# Patient Record
Sex: MALE | Race: WHITE | NOT HISPANIC OR LATINO | Employment: OTHER | URBAN - NONMETROPOLITAN AREA
[De-identification: names, ages, dates, MRNs, and addresses within clinical notes are randomized per-mention and may not be internally consistent; named-entity substitution may affect disease eponyms.]

---

## 2017-07-17 PROBLEM — S02.92XA MULTIPLE CLOSED FRACTURES OF FACIAL BONE: Status: ACTIVE | Noted: 2017-07-17

## 2017-07-17 PROBLEM — S02.92XA: Status: RESOLVED | Noted: 2017-07-17 | Resolved: 2017-07-17

## 2020-12-16 PROBLEM — S76.219A GROIN STRAIN: Status: RESOLVED | Noted: 2020-09-16 | Resolved: 2020-12-16

## 2021-09-16 PROBLEM — M75.02 ADHESIVE CAPSULITIS OF LEFT SHOULDER: Status: RESOLVED | Noted: 2017-12-23 | Resolved: 2021-09-16

## 2023-02-14 ENCOUNTER — TELEPHONE (OUTPATIENT)
Dept: FAMILY MEDICINE CLINIC | Facility: CLINIC | Age: 48
End: 2023-02-14

## 2023-02-14 NOTE — TELEPHONE ENCOUNTER
Caller: Laila Stephens    Relationship: Emergency Contact    Best call back number: [phone]    What medication are you requesting:     ANTIBIOTICS    What are your current symptoms:     SORE THROAT, COUGH, HOARSE    How long have you been experiencing symptoms: THREE DAYS       If a prescription is needed, what is your preferred pharmacy and phone number:      [facility] [place], [place] - [address] - [phone]  - [phone] FX

## 2023-02-15 NOTE — TELEPHONE ENCOUNTER
His wife is going to ask him and let us know. She did say that they did an at home test and it came back negative.

## 2023-07-27 ENCOUNTER — OFFICE VISIT (OUTPATIENT)
Dept: FAMILY MEDICINE CLINIC | Facility: CLINIC | Age: 48
End: 2023-07-27
Payer: MEDICARE

## 2023-07-27 VITALS
HEART RATE: 102 BPM | OXYGEN SATURATION: 96 % | WEIGHT: 315 LBS | SYSTOLIC BLOOD PRESSURE: 124 MMHG | BODY MASS INDEX: 50.62 KG/M2 | TEMPERATURE: 98.6 F | DIASTOLIC BLOOD PRESSURE: 65 MMHG | HEIGHT: 66 IN | RESPIRATION RATE: 15 BRPM

## 2023-07-27 DIAGNOSIS — I10 ESSENTIAL HYPERTENSION: ICD-10-CM

## 2023-07-27 DIAGNOSIS — Z86.711 HISTORY OF PULMONARY EMBOLISM: ICD-10-CM

## 2023-07-27 DIAGNOSIS — K21.9 GASTROESOPHAGEAL REFLUX DISEASE WITHOUT ESOPHAGITIS: ICD-10-CM

## 2023-07-27 DIAGNOSIS — E11.42 TYPE 2 DIABETES MELLITUS WITH PERIPHERAL NEUROPATHY: ICD-10-CM

## 2023-07-27 DIAGNOSIS — E11.9 TYPE 2 DIABETES MELLITUS WITHOUT COMPLICATION, WITHOUT LONG-TERM CURRENT USE OF INSULIN: ICD-10-CM

## 2023-07-27 DIAGNOSIS — Z00.00 HEALTHCARE MAINTENANCE: ICD-10-CM

## 2023-07-27 DIAGNOSIS — G47.33 OBSTRUCTIVE SLEEP APNEA: ICD-10-CM

## 2023-07-27 DIAGNOSIS — G56.03 BILATERAL CARPAL TUNNEL SYNDROME: ICD-10-CM

## 2023-07-27 DIAGNOSIS — K44.9 PARAESOPHAGEAL HIATAL HERNIA: ICD-10-CM

## 2023-07-27 DIAGNOSIS — N52.01 ERECTILE DYSFUNCTION DUE TO ARTERIAL INSUFFICIENCY: ICD-10-CM

## 2023-07-27 DIAGNOSIS — M25.561 CHRONIC PAIN OF BOTH KNEES: ICD-10-CM

## 2023-07-27 DIAGNOSIS — G89.11 ACUTE PAIN DUE TO TRAUMA: ICD-10-CM

## 2023-07-27 DIAGNOSIS — M25.562 CHRONIC PAIN OF BOTH KNEES: ICD-10-CM

## 2023-07-27 DIAGNOSIS — E55.9 VITAMIN D DEFICIENCY: ICD-10-CM

## 2023-07-27 DIAGNOSIS — D50.0 IRON DEFICIENCY ANEMIA DUE TO CHRONIC BLOOD LOSS: ICD-10-CM

## 2023-07-27 DIAGNOSIS — R35.0 URINARY FREQUENCY: ICD-10-CM

## 2023-07-27 DIAGNOSIS — D35.2 PITUITARY ADENOMA: ICD-10-CM

## 2023-07-27 DIAGNOSIS — I87.2 CHRONIC VENOUS INSUFFICIENCY: ICD-10-CM

## 2023-07-27 DIAGNOSIS — E78.2 MIXED HYPERLIPIDEMIA: Primary | ICD-10-CM

## 2023-07-27 DIAGNOSIS — G89.29 CHRONIC PAIN OF BOTH KNEES: ICD-10-CM

## 2023-07-27 DIAGNOSIS — M54.59 MECHANICAL LOW BACK PAIN: ICD-10-CM

## 2023-07-27 PROCEDURE — 83036 HEMOGLOBIN GLYCOSYLATED A1C: CPT | Performed by: GENERAL PRACTICE

## 2023-07-27 PROCEDURE — 83540 ASSAY OF IRON: CPT | Performed by: GENERAL PRACTICE

## 2023-07-27 PROCEDURE — 85025 COMPLETE CBC W/AUTO DIFF WBC: CPT | Performed by: GENERAL PRACTICE

## 2023-07-27 PROCEDURE — 84443 ASSAY THYROID STIM HORMONE: CPT | Performed by: GENERAL PRACTICE

## 2023-07-27 PROCEDURE — 80053 COMPREHEN METABOLIC PANEL: CPT | Performed by: GENERAL PRACTICE

## 2023-07-27 PROCEDURE — 82306 VITAMIN D 25 HYDROXY: CPT | Performed by: GENERAL PRACTICE

## 2023-07-27 PROCEDURE — 80061 LIPID PANEL: CPT | Performed by: GENERAL PRACTICE

## 2023-07-27 PROCEDURE — 82043 UR ALBUMIN QUANTITATIVE: CPT | Performed by: GENERAL PRACTICE

## 2023-07-27 PROCEDURE — 82728 ASSAY OF FERRITIN: CPT | Performed by: GENERAL PRACTICE

## 2023-07-27 PROCEDURE — 84466 ASSAY OF TRANSFERRIN: CPT | Performed by: GENERAL PRACTICE

## 2023-07-27 RX ORDER — ACETAMINOPHEN AND CODEINE PHOSPHATE 300; 30 MG/1; MG/1
TABLET ORAL
Qty: 120 TABLET | Refills: 2 | Status: SHIPPED | OUTPATIENT
Start: 2023-07-27

## 2023-07-27 RX ORDER — GABAPENTIN 300 MG/1
300 CAPSULE ORAL 2 TIMES DAILY
Qty: 60 CAPSULE | Refills: 2 | Status: SHIPPED | OUTPATIENT
Start: 2023-07-27

## 2023-07-27 RX ORDER — ATORVASTATIN CALCIUM 80 MG/1
80 TABLET, FILM COATED ORAL NIGHTLY
Qty: 90 TABLET | Refills: 3 | Status: SHIPPED | OUTPATIENT
Start: 2023-07-27

## 2023-07-27 RX ORDER — OXYBUTYNIN CHLORIDE 10 MG/1
10 TABLET, EXTENDED RELEASE ORAL DAILY
Qty: 90 TABLET | Refills: 3 | Status: SHIPPED | OUTPATIENT
Start: 2023-07-27

## 2023-07-27 RX ORDER — EZETIMIBE 10 MG/1
10 TABLET ORAL DAILY
Qty: 90 TABLET | Refills: 3 | Status: SHIPPED | OUTPATIENT
Start: 2023-07-27

## 2023-07-27 RX ORDER — FERROUS SULFATE 325(65) MG
325 TABLET ORAL 2 TIMES DAILY
Qty: 180 TABLET | Refills: 1 | Status: SHIPPED | OUTPATIENT
Start: 2023-07-27

## 2023-07-27 RX ORDER — PANTOPRAZOLE SODIUM 40 MG/1
40 TABLET, DELAYED RELEASE ORAL DAILY
Qty: 90 TABLET | Refills: 3 | Status: SHIPPED | OUTPATIENT
Start: 2023-07-27

## 2023-07-27 RX ORDER — ASCORBIC ACID 500 MG
TABLET ORAL
Qty: 180 TABLET | Refills: 1 | Status: SHIPPED | OUTPATIENT
Start: 2023-07-27

## 2023-07-27 RX ORDER — NAPROXEN 500 MG/1
500 TABLET ORAL 2 TIMES DAILY PRN
Qty: 60 TABLET | Refills: 5 | Status: SHIPPED | OUTPATIENT
Start: 2023-07-27

## 2023-07-27 RX ORDER — CYCLOBENZAPRINE HCL 10 MG
10 TABLET ORAL 3 TIMES DAILY PRN
Qty: 90 TABLET | Refills: 5 | Status: SHIPPED | OUTPATIENT
Start: 2023-07-27

## 2023-07-27 RX ORDER — ERGOCALCIFEROL 1.25 MG/1
50000 CAPSULE ORAL WEEKLY
Qty: 12 CAPSULE | Refills: 1 | Status: SHIPPED | OUTPATIENT
Start: 2023-07-27

## 2023-07-27 NOTE — PROGRESS NOTES
Subjective   MAXIME Stephens is a 47 y.o. male.     Chief Complaint  He returns for a scheduled reassessment of multiple medical problems including recurrent DVT/PE, recurrent iron deficiency anemia, pituitary adenoma, type 2 diabetes mellitus, hyperlipidemia, chronic back and joint pain, and bilateral carpal tunnel syndrome    History of Present Illness     Recurrent DVT/PE  S/P IVC filter. He continues to deny any change in his lower extremity swelling or shortness of breath, and has had no calf pain, chest pain or hemoptysis. He ran out of rivaroxaban several weeks ago.  He is quite aware of the importance of continued anticoagulation    Iron Deficiency Anemia  He has required transfusion of packed red blood cells in the past. He continues to deny any hematochezia, melena, or hematuria.  He remains on ferrous sulfate 325 twice daily.  Lab Results   Component Value Date    WBC 6.01 02/20/2023    HGB 13.6 02/20/2023    HCT 43.2 02/20/2023    MCV 83.1 02/20/2023     02/20/2023     Lab Results   Component Value Date    IRON 35 (L) 02/20/2023    TIBC 414 10/06/2017    FERRITIN 42.90 02/20/2023     Pituitary Adenoma  This was discovered incidentally when he was worked up for thrombosed orbital varices. It was felt to be nonsecreting and was stable on several follow-up studies but he has not undergone neurosurgical reassessment for several years.  He denies any new headaches and has had no visual disturbances.  Due to his size, a local MRI has not been possible    Type 2 Diabetes Mellitus  He admits to paresthesias of the feet.  He continues to deny any visual disturbances, polydipsia, polyuria, hypoglycemia or foot ulcerations.  He does not check his glucose.  He remains on metformin.  He did quite well with semaglutide but is unable to afford it.  His last dilated eye exam was more then one year ago.    Lab Results   Component Value Date    HGBA1C 6.60 (H) 02/20/2023     Lab Results   Component Value Date     MICROALBUR <1.2 07/26/2022      Hyperlipidemia  His compliance with treatment has been fair.  He has been trying to follow his diet and get what exercise his back and joint pain permits. He remains on atorvastatin with no apparent side effects.  Lab Results   Component Value Date    CHOL 196 02/20/2023    CHLPL 210 (H) 03/29/2016    TRIG 115 02/20/2023    HDL 38 (L) 02/20/2023     (H) 02/20/2023     Urinary Frequency  He underwent a urology assessment with Dr. Gonzalez and was started on oxybutynin every evening with an improvement. Apart from a dry mouth he has not experienced any side effects.     Joint Pain  He has chronic low back, left shoulder, and bilateral knee pain.  He feels this has worsened with time, but continues to deny any change in the quality or any new associated symptoms.  He remains on naproxen, gabapentin, cyclobenzaprine, and codeine/APAP with fairly good effect and no apparent side effects.      Carpal Tunnel Syndrome  He continues to experience intermittent paresthesias of both hands extending proximally to the mid forearms.  This remains present more than not and has been associated with some reduction in his  strength.  There is no history of any numbness and he continues to deny any new neck pain, nor any change in his lower extremity strength or bowel/bladder control.  With the exception of activity he has been unable to identify any exacerbating factors. Shaking his hands seems to help and he has been wearing splints at night with some improvement.  NCS/EMG performed on 10/9/2019 confirmed changes consistent with a bilateral median neuropathy worse on the right.  He remains uninterested in pursuing a surgical assessment at present    Labs  Most recent vitamin D 18.2.  He was started back on supplementation afterward  Lab Results   Component Value Date    GLUCOSE 94 02/20/2023    BUN 7 02/20/2023    CREATININE 0.63 (L) 02/20/2023    EGFR 116.6 02/20/2023    BCR 11.1 02/20/2023     K 4.3 02/20/2023    CO2 29.3 (H) 02/20/2023    CALCIUM 9.2 02/20/2023    ALBUMIN 3.7 02/20/2023    BILITOT 0.4 02/20/2023    AST 31 02/20/2023    ALT 32 02/20/2023     Lab Results   Component Value Date    ALKPHOS 81 02/20/2023     The following portions of the patient's history were reviewed and updated as appropriate: allergies, current medications, past medical history, past social history, and problem list.    Review of Systems   Constitutional:  Positive for fatigue. Negative for appetite change, chills and fever.   HENT:  Negative for congestion, ear pain, rhinorrhea, sneezing and sore throat.    Eyes:  Negative for visual disturbance.   Respiratory:  Positive for cough and shortness of breath. Negative for wheezing.    Cardiovascular:  Positive for leg swelling. Negative for chest pain and palpitations.   Gastrointestinal:  Negative for abdominal pain, blood in stool, constipation, diarrhea, nausea and vomiting.   Endocrine: Negative for polydipsia and polyuria.        Intermittent hot flashes   Genitourinary:  Positive for frequency. Negative for difficulty urinating, dysuria, hematuria and urgency.   Musculoskeletal:  Positive for arthralgias and back pain. Negative for joint swelling and neck pain.   Skin:  Negative for rash.   Neurological:  Positive for weakness (both hands) and numbness (both feet). Negative for dizziness, tremors and headaches.        Paresthesias both hands   Psychiatric/Behavioral:  Positive for decreased concentration. Negative for dysphoric mood and sleep disturbance. The patient is not nervous/anxious.      Objective   Physical Exam  Constitutional:       General: He is not in acute distress.     Appearance: Normal appearance. He is well-developed. He is not ill-appearing or diaphoretic.      Comments: Bright and in fair spirits.  Antalgic gait.  Able to climb onto the exam table with minimal one-person assistance.  No apparent distress at rest.  No pallor, jaundice,  diaphoresis, or cyanosis.   HENT:      Head: Atraumatic.      Right Ear: Tympanic membrane, ear canal and external ear normal.      Left Ear: Tympanic membrane, ear canal and external ear normal.   Eyes:      Conjunctiva/sclera: Conjunctivae normal.   Neck:      Thyroid: No thyroid mass or thyromegaly.      Vascular: No carotid bruit or JVD.      Trachea: Trachea normal. No tracheal deviation.   Cardiovascular:      Rate and Rhythm: Normal rate and regular rhythm.      Heart sounds: Normal heart sounds, S1 normal and S2 normal. No murmur heard.    No gallop. No S3 or S4 sounds.   Pulmonary:      Effort: Pulmonary effort is normal.      Breath sounds: Normal breath sounds.   Abdominal:      General: Abdomen is protuberant. Bowel sounds are normal.   Musculoskeletal:      Right lower le+ Pitting Edema (with stasis changes) present.      Left lower le+ Pitting Edema (with stasis changes) present.   Lymphadenopathy:      Head:      Right side of head: No submental, submandibular, tonsillar, preauricular, posterior auricular or occipital adenopathy.      Left side of head: No submental, submandibular, tonsillar, preauricular, posterior auricular or occipital adenopathy.      Cervical: No cervical adenopathy.      Upper Body:      Right upper body: No supraclavicular adenopathy.      Left upper body: No supraclavicular adenopathy.   Skin:     General: Skin is warm and dry.      Coloration: Skin is not cyanotic, jaundiced or pale.      Findings: No rash.      Nails: There is no clubbing.   Neurological:      Mental Status: He is alert and oriented to person, place, and time.      Cranial Nerves: No cranial nerve deficit.      Sensory: Sensory deficit (decreased vibration sense toes of both feet) present.      Motor: No tremor.      Coordination: Coordination normal.      Gait: Gait normal.   Psychiatric:         Attention and Perception: Attention normal.         Mood and Affect: Mood normal.         Speech: Speech  normal.         Behavior: Behavior normal.         Thought Content: Thought content normal.     Assessment & Plan   Problems Addressed this Visit          Cardiac and Vasculature    Chronic venous insufficiency  Reminded regarding lifestyle modification    Essential hypertension   Hypertension:  BP remains at goal off medication . Evidence of target organ damage: none.  Encouraged to continue to work on diet and exercise plan.   We will continue to monitor BP and urine microalbumin levels    Relevant Orders    Comprehensive Metabolic Panel    TSH    Mixed hyperlipidemia   As above.   Continue current medication.    Relevant Medications    atorvastatin (LIPITOR) 80 MG tablet    ezetimibe (ZETIA) 10 MG tablet    Other Relevant Orders    Comprehensive Metabolic Panel    Lipid Panel    TSH       Coag and Thromboembolic    History of pulmonary embolism  Reminded of the importance of continued anticoagulation  Continue current medication    Relevant Medications    rivaroxaban (Xarelto) 20 MG tablet    Other Relevant Orders    CBC & Differential       Endocrine and Metabolic    Type 2 diabetes mellitus with peripheral neuropathy  Diabetes mellitus Type II, under excellent control.   Encouraged to continue to pursue ADA diet  Encouraged aerobic exercise.  Continue current medication  Reminded to get yearly retinal exam.  Updated labs drawn.    Relevant Medications    metFORMIN (GLUCOPHAGE) 500 MG tablet    Other Relevant Orders    Comprehensive Metabolic Panel    Hemoglobin A1c    MicroAlbumin, Urine, Random - Urine, Clean Catch    TSH    Vitamin D deficiency  Continue supplementation with monitoring.    Relevant Medications    vitamin D (ERGOCALCIFEROL) 1.25 MG (82679 UT) capsule capsule    Other Relevant Orders    Vitamin D,25-Hydroxy       Gastrointestinal Abdominal     Gastroesophageal reflux disease without esophagitis    Relevant Medications    pantoprazole (PROTONIX) 40 MG EC tablet    Other Relevant Orders    CBC &  Differential    Paraesophageal hiatal hernia    Relevant Medications    pantoprazole (PROTONIX) 40 MG EC tablet       Genitourinary and Reproductive     Vasculogenic erectile dysfunction       Health Encounters    Healthcare maintenance  Reminded to get a flu shot when available.  We will discuss colon cancer screening and varicella vaccination at his return       Hematology and Neoplasia    Iron deficiency anemia due to chronic blood loss  Continue supplementation with monitoring.    Relevant Medications    Ascorbic Acid (vitamin C with kimo hips tablet) 500 MG tablet    ferrous sulfate 325 (65 FE) MG tablet    Other Relevant Orders    CBC & Differential    Iron    Transferrin    Ferritin    Pituitary adenoma       Musculoskeletal and Injuries    Bilateral knee pain  Reminded regarding symptomatic treatment.   Continue current medication  Reviewed further options and agreed on a pain management consultation.  Encouraged report if any worse or for any new symptoms or concerns in the meantime    Relevant Medications    acetaminophen-codeine (TYLENOL with CODEINE #3) 300-30 MG per tablet    gabapentin (NEURONTIN) 300 MG capsule    naproxen (NAPROSYN) 500 MG tablet    Other Relevant Orders    Ambulatory Referral to Pain Management Clinic    Mechanical low back pain  As above.    Relevant Medications    acetaminophen-codeine (TYLENOL with CODEINE #3) 300-30 MG per tablet    gabapentin (NEURONTIN) 300 MG capsule    naproxen (NAPROSYN) 500 MG tablet    Other Relevant Orders    Ambulatory Referral to Pain Management Clinic       Neuro    Bilateral carpal tunnel syndrome  Patient remains uninterested in surgical consultation  Encouraged to report if any worse or if any new symptoms or concerns.       Sleep    Obstructive sleep apnea         Diagnoses         Codes Comments    Mixed hyperlipidemia    -  Primary ICD-10-CM: E78.2  ICD-9-CM: 272.2     Essential hypertension     ICD-10-CM: I10  ICD-9-CM: 401.9     Chronic venous  insufficiency     ICD-10-CM: I87.2  ICD-9-CM: 459.81     History of pulmonary embolism     ICD-10-CM: Z86.711  ICD-9-CM: V12.55     Type 2 diabetes mellitus with peripheral neuropathy     ICD-10-CM: E11.42  ICD-9-CM: 250.60, 357.2     Vitamin D deficiency     ICD-10-CM: E55.9  ICD-9-CM: 268.9     Gastroesophageal reflux disease without esophagitis     ICD-10-CM: K21.9  ICD-9-CM: 530.81     Erectile dysfunction due to arterial insufficiency     ICD-10-CM: N52.01  ICD-9-CM: 607.84     Healthcare maintenance     ICD-10-CM: Z00.00  ICD-9-CM: V70.0     Iron deficiency anemia due to chronic blood loss     ICD-10-CM: D50.0  ICD-9-CM: 280.0     Pituitary adenoma     ICD-10-CM: D35.2  ICD-9-CM: 227.3     Chronic pain of both knees     ICD-10-CM: M25.561, M25.562, G89.29  ICD-9-CM: 719.46, 338.29     Mechanical low back pain     ICD-10-CM: M54.59  ICD-9-CM: 724.2     Bilateral carpal tunnel syndrome     ICD-10-CM: G56.03  ICD-9-CM: 354.0     Obstructive sleep apnea     ICD-10-CM: G47.33  ICD-9-CM: 327.23     Acute pain due to trauma     ICD-10-CM: G89.11  ICD-9-CM: 338.11     Type 2 diabetes mellitus without complication, without long-term current use of insulin     ICD-10-CM: E11.9  ICD-9-CM: 250.00     Urinary frequency     ICD-10-CM: R35.0  ICD-9-CM: 788.41     Paraesophageal hiatal hernia     ICD-10-CM: K44.9  ICD-9-CM: 553.3           I spent 43 minutes caring for MAXIME Stephens on this date of service. This time includes time spent by me in the following activities:reviewing tests, performing a medically appropriate examination and/or evaluation , counseling and educating the patient/family/caregiver, ordering medications, tests, or procedures and documenting information in the medical record

## 2023-07-28 LAB
25(OH)D3 SERPL-MCNC: 15.9 NG/ML (ref 30–100)
ALBUMIN SERPL-MCNC: 4.2 G/DL (ref 3.5–5.2)
ALBUMIN UR-MCNC: 14.8 MG/DL
ALBUMIN/GLOB SERPL: 1.3 G/DL
ALP SERPL-CCNC: 74 U/L (ref 39–117)
ALT SERPL W P-5'-P-CCNC: 30 U/L (ref 1–41)
ANION GAP SERPL CALCULATED.3IONS-SCNC: 9 MMOL/L (ref 5–15)
AST SERPL-CCNC: 26 U/L (ref 1–40)
BASOPHILS # BLD AUTO: 0.05 10*3/MM3 (ref 0–0.2)
BASOPHILS NFR BLD AUTO: 0.9 % (ref 0–1.5)
BILIRUB SERPL-MCNC: 0.6 MG/DL (ref 0–1.2)
BUN SERPL-MCNC: 9 MG/DL (ref 6–20)
BUN/CREAT SERPL: 13.6 (ref 7–25)
CALCIUM SPEC-SCNC: 9.3 MG/DL (ref 8.6–10.5)
CHLORIDE SERPL-SCNC: 103 MMOL/L (ref 98–107)
CHOLEST SERPL-MCNC: 203 MG/DL (ref 0–200)
CO2 SERPL-SCNC: 27 MMOL/L (ref 22–29)
CREAT SERPL-MCNC: 0.66 MG/DL (ref 0.76–1.27)
DEPRECATED RDW RBC AUTO: 46.5 FL (ref 37–54)
EGFRCR SERPLBLD CKD-EPI 2021: 115 ML/MIN/1.73
EOSINOPHIL # BLD AUTO: 0.19 10*3/MM3 (ref 0–0.4)
EOSINOPHIL NFR BLD AUTO: 3.5 % (ref 0.3–6.2)
ERYTHROCYTE [DISTWIDTH] IN BLOOD BY AUTOMATED COUNT: 15 % (ref 12.3–15.4)
FERRITIN SERPL-MCNC: 35.1 NG/ML (ref 30–400)
GLOBULIN UR ELPH-MCNC: 3.3 GM/DL
GLUCOSE SERPL-MCNC: 100 MG/DL (ref 65–99)
HBA1C MFR BLD: 6.7 % (ref 4.8–5.6)
HCT VFR BLD AUTO: 48.1 % (ref 37.5–51)
HDLC SERPL-MCNC: 38 MG/DL (ref 40–60)
HGB BLD-MCNC: 15.3 G/DL (ref 13–17.7)
IMM GRANULOCYTES # BLD AUTO: 0.01 10*3/MM3 (ref 0–0.05)
IMM GRANULOCYTES NFR BLD AUTO: 0.2 % (ref 0–0.5)
IRON 24H UR-MRATE: 73 MCG/DL (ref 59–158)
LDLC SERPL CALC-MCNC: 138 MG/DL (ref 0–100)
LDLC/HDLC SERPL: 3.55 {RATIO}
LYMPHOCYTES # BLD AUTO: 1.46 10*3/MM3 (ref 0.7–3.1)
LYMPHOCYTES NFR BLD AUTO: 27.1 % (ref 19.6–45.3)
MCH RBC QN AUTO: 27.2 PG (ref 26.6–33)
MCHC RBC AUTO-ENTMCNC: 31.8 G/DL (ref 31.5–35.7)
MCV RBC AUTO: 85.6 FL (ref 79–97)
MONOCYTES # BLD AUTO: 0.57 10*3/MM3 (ref 0.1–0.9)
MONOCYTES NFR BLD AUTO: 10.6 % (ref 5–12)
NEUTROPHILS NFR BLD AUTO: 3.1 10*3/MM3 (ref 1.7–7)
NEUTROPHILS NFR BLD AUTO: 57.7 % (ref 42.7–76)
NRBC BLD AUTO-RTO: 0 /100 WBC (ref 0–0.2)
PLATELET # BLD AUTO: 206 10*3/MM3 (ref 140–450)
PMV BLD AUTO: 11.4 FL (ref 6–12)
POTASSIUM SERPL-SCNC: 4.1 MMOL/L (ref 3.5–5.2)
PROT SERPL-MCNC: 7.5 G/DL (ref 6–8.5)
RBC # BLD AUTO: 5.62 10*6/MM3 (ref 4.14–5.8)
SODIUM SERPL-SCNC: 139 MMOL/L (ref 136–145)
TRANSFERRIN SERPL-MCNC: 313 MG/DL (ref 200–360)
TRIGL SERPL-MCNC: 150 MG/DL (ref 0–150)
TSH SERPL DL<=0.05 MIU/L-ACNC: 1.89 UIU/ML (ref 0.27–4.2)
VLDLC SERPL-MCNC: 27 MG/DL (ref 5–40)
WBC NRBC COR # BLD: 5.38 10*3/MM3 (ref 3.4–10.8)

## 2023-07-28 NOTE — PROGRESS NOTES
Sent [employer] message.     -- Please let patient know that his blood counts and iron levels are ok but that his glucose and cholesterol are a bit high and his vitamin D low He taking all his meds consistently?

## 2023-08-04 ENCOUNTER — TELEPHONE (OUTPATIENT)
Dept: FAMILY MEDICINE CLINIC | Facility: CLINIC | Age: 48
End: 2023-08-04
Payer: MEDICARE

## 2023-11-02 ENCOUNTER — OFFICE VISIT (OUTPATIENT)
Dept: FAMILY MEDICINE CLINIC | Facility: CLINIC | Age: 48
End: 2023-11-02
Payer: MEDICARE

## 2023-11-02 VITALS
HEIGHT: 66 IN | BODY MASS INDEX: 50.62 KG/M2 | TEMPERATURE: 98.6 F | SYSTOLIC BLOOD PRESSURE: 124 MMHG | HEART RATE: 100 BPM | WEIGHT: 315 LBS | OXYGEN SATURATION: 94 % | RESPIRATION RATE: 15 BRPM | DIASTOLIC BLOOD PRESSURE: 68 MMHG

## 2023-11-02 DIAGNOSIS — E55.9 VITAMIN D DEFICIENCY: ICD-10-CM

## 2023-11-02 DIAGNOSIS — G89.29 CHRONIC PAIN OF BOTH KNEES: ICD-10-CM

## 2023-11-02 DIAGNOSIS — R35.0 URINARY FREQUENCY: ICD-10-CM

## 2023-11-02 DIAGNOSIS — G89.11 ACUTE PAIN DUE TO TRAUMA: ICD-10-CM

## 2023-11-02 DIAGNOSIS — E11.42 TYPE 2 DIABETES MELLITUS WITH PERIPHERAL NEUROPATHY: ICD-10-CM

## 2023-11-02 DIAGNOSIS — G47.33 OBSTRUCTIVE SLEEP APNEA: ICD-10-CM

## 2023-11-02 DIAGNOSIS — N52.01 ERECTILE DYSFUNCTION DUE TO ARTERIAL INSUFFICIENCY: ICD-10-CM

## 2023-11-02 DIAGNOSIS — I10 ESSENTIAL HYPERTENSION: ICD-10-CM

## 2023-11-02 DIAGNOSIS — Z86.711 HISTORY OF PULMONARY EMBOLISM: ICD-10-CM

## 2023-11-02 DIAGNOSIS — G56.03 BILATERAL CARPAL TUNNEL SYNDROME: ICD-10-CM

## 2023-11-02 DIAGNOSIS — I87.2 CHRONIC VENOUS INSUFFICIENCY: ICD-10-CM

## 2023-11-02 DIAGNOSIS — K44.9 PARAESOPHAGEAL HIATAL HERNIA: ICD-10-CM

## 2023-11-02 DIAGNOSIS — Z00.00 HEALTHCARE MAINTENANCE: ICD-10-CM

## 2023-11-02 DIAGNOSIS — Z12.11 ENCOUNTER FOR SCREENING FOR MALIGNANT NEOPLASM OF COLON: ICD-10-CM

## 2023-11-02 DIAGNOSIS — E29.1 ANDROGEN DEFICIENCY: ICD-10-CM

## 2023-11-02 DIAGNOSIS — M25.561 CHRONIC PAIN OF BOTH KNEES: ICD-10-CM

## 2023-11-02 DIAGNOSIS — M54.59 MECHANICAL LOW BACK PAIN: ICD-10-CM

## 2023-11-02 DIAGNOSIS — E66.01 CLASS 3 SEVERE OBESITY WITH SERIOUS COMORBIDITY AND BODY MASS INDEX (BMI) GREATER THAN OR EQUAL TO 70 IN ADULT, UNSPECIFIED OBESITY TYPE: ICD-10-CM

## 2023-11-02 DIAGNOSIS — K21.9 GASTROESOPHAGEAL REFLUX DISEASE WITHOUT ESOPHAGITIS: ICD-10-CM

## 2023-11-02 DIAGNOSIS — E11.9 TYPE 2 DIABETES MELLITUS WITHOUT COMPLICATION, WITHOUT LONG-TERM CURRENT USE OF INSULIN: ICD-10-CM

## 2023-11-02 DIAGNOSIS — D35.2 PITUITARY ADENOMA: ICD-10-CM

## 2023-11-02 DIAGNOSIS — E78.2 MIXED HYPERLIPIDEMIA: Primary | ICD-10-CM

## 2023-11-02 DIAGNOSIS — D50.0 IRON DEFICIENCY ANEMIA DUE TO CHRONIC BLOOD LOSS: ICD-10-CM

## 2023-11-02 DIAGNOSIS — M25.562 CHRONIC PAIN OF BOTH KNEES: ICD-10-CM

## 2023-11-02 DIAGNOSIS — Z23 ENCOUNTER FOR IMMUNIZATION: ICD-10-CM

## 2023-11-02 PROBLEM — E66.813 CLASS 3 SEVERE OBESITY WITH SERIOUS COMORBIDITY AND BODY MASS INDEX (BMI) GREATER THAN OR EQUAL TO 70 IN ADULT: Status: ACTIVE | Noted: 2023-11-02

## 2023-11-02 RX ORDER — ASCORBIC ACID 500 MG
TABLET ORAL
Qty: 180 TABLET | Refills: 1 | Status: SHIPPED | OUTPATIENT
Start: 2023-11-02

## 2023-11-02 RX ORDER — ATORVASTATIN CALCIUM 80 MG/1
80 TABLET, FILM COATED ORAL NIGHTLY
Qty: 90 TABLET | Refills: 3 | Status: SHIPPED | OUTPATIENT
Start: 2023-11-02

## 2023-11-02 RX ORDER — NAPROXEN 500 MG/1
500 TABLET ORAL 2 TIMES DAILY PRN
Qty: 60 TABLET | Refills: 5 | Status: SHIPPED | OUTPATIENT
Start: 2023-11-02

## 2023-11-02 RX ORDER — EZETIMIBE 10 MG/1
10 TABLET ORAL DAILY
Qty: 90 TABLET | Refills: 3 | Status: SHIPPED | OUTPATIENT
Start: 2023-11-02

## 2023-11-02 RX ORDER — ACETAMINOPHEN AND CODEINE PHOSPHATE 300; 30 MG/1; MG/1
TABLET ORAL
Qty: 120 TABLET | Refills: 2 | Status: SHIPPED | OUTPATIENT
Start: 2023-11-02

## 2023-11-02 RX ORDER — OXYBUTYNIN CHLORIDE 10 MG/1
10 TABLET, EXTENDED RELEASE ORAL DAILY
Qty: 90 TABLET | Refills: 3 | Status: SHIPPED | OUTPATIENT
Start: 2023-11-02

## 2023-11-02 RX ORDER — PANTOPRAZOLE SODIUM 40 MG/1
40 TABLET, DELAYED RELEASE ORAL DAILY
Qty: 90 TABLET | Refills: 3 | Status: SHIPPED | OUTPATIENT
Start: 2023-11-02

## 2023-11-02 RX ORDER — FERROUS SULFATE 325(65) MG
325 TABLET ORAL 2 TIMES DAILY
Qty: 180 TABLET | Refills: 1 | Status: SHIPPED | OUTPATIENT
Start: 2023-11-02

## 2023-11-02 RX ORDER — GABAPENTIN 300 MG/1
300 CAPSULE ORAL 2 TIMES DAILY
Qty: 60 CAPSULE | Refills: 2 | Status: SHIPPED | OUTPATIENT
Start: 2023-11-02

## 2023-11-02 RX ORDER — CYCLOBENZAPRINE HCL 10 MG
10 TABLET ORAL 3 TIMES DAILY PRN
Qty: 90 TABLET | Refills: 5 | Status: SHIPPED | OUTPATIENT
Start: 2023-11-02

## 2023-11-02 RX ORDER — ERGOCALCIFEROL 1.25 MG/1
50000 CAPSULE ORAL WEEKLY
Qty: 12 CAPSULE | Refills: 1 | Status: SHIPPED | OUTPATIENT
Start: 2023-11-02

## 2023-11-02 NOTE — PROGRESS NOTES
Wiley Stephens is a 48 y.o. male.     Chief Complaint  He returns for a scheduled reassessment of multiple medical problems including class III obesity, history of recurrent DVT, type 2 diabetes mellitus, hyperlipidemia, chronic joint pain, and carpal tunnel syndrome    History of Present Illness     Class III Obesity  He has a BMI of 72 complicated by type 2 diabetes mellitus, hyperlipidemia, chronic back and joint pain, gastroesophageal reflux disease, chronic venous insufficiency, recurrent DVT with previous PE, and HEMALATHA.  He is following an appropriate diet but his activities remain quite limited due to his back and joint pain.  He is a high risk candidate for bariatric surgery, and cannot afford a GLP agonist at present    Recurrent DVT/PE  S/P IVC filter. He continues to deny any change in his lower extremity swelling or shortness of breath, and has had no calf pain, chest pain or hemoptysis. He is currently taking rivaroxaban 20 daily    Iron Deficiency Anemia  He has required transfusion of packed red blood cells in the past. He continues to deny any hematochezia, melena, or hematuria.  He remains on ferrous sulfate 325 twice daily.  Lab Results   Component Value Date    WBC 5.38 07/27/2023    HGB 15.3 07/27/2023    HCT 48.1 07/27/2023    MCV 85.6 07/27/2023     07/27/2023     Lab Results   Component Value Date    IRON 73 07/27/2023    TIBC 414 10/06/2017    FERRITIN 35.10 07/27/2023     Pituitary Adenoma  This was discovered incidentally when he was worked up for thrombosed orbital varices. It was felt to be nonsecreting and was stable on several follow-up studies, but he has not undergone neurosurgical reassessment for several years.  He denies any new headaches and has had no visual disturbances.  Due to his size, a local MRI has not been possible  Lab Results   Component Value Date    TSH 1.890 07/27/2023     Type 2 Diabetes Mellitus  He admits to paresthesias of the feet.  He  continues to deny any visual disturbances, polydipsia, polyuria, hypoglycemia or foot ulcerations.  He does not check his glucose.  He remains on metformin.  He did quite well with semaglutide but is unable to afford it.  His last dilated eye exam was more then one year ago.    Lab Results   Component Value Date    HGBA1C 6.70 (H) 07/27/2023     Lab Results   Component Value Date    MICROALBUR 14.8 07/27/2023      Hyperlipidemia  His compliance with treatment has been fair.  He has been trying to follow his diet and get what exercise his back and joint pain permits. He remains on atorvastatin with no apparent side effects.  Lab Results   Component Value Date    CHOL 203 (H) 07/27/2023    CHLPL 210 (H) 03/29/2016    TRIG 150 07/27/2023    HDL 38 (L) 07/27/2023     (H) 07/27/2023     Urinary Frequency  He underwent a urology assessment with Dr. Gonzalez and was started on oxybutynin every evening with an improvement. Apart from a dry mouth he has not experienced any side effects.     Joint Pain  He has chronic low back, left shoulder, and bilateral knee pain.  He feels this has worsened with time, but continues to deny any change in the quality or any new associated symptoms.  He remains on naproxen, gabapentin, cyclobenzaprine, and codeine/APAP with fairly good effect and no apparent side effects.  He was referred to pain management at his last visit, but was unable to follow-up with his appointment, and has yet to reschedule    Carpal Tunnel Syndrome  He continues to experience intermittent paresthesias of both hands extending proximally to the mid forearms.  This remains present more than not and has been associated with some reduction in his  strength.  There is no history of any numbness and he continues to deny any new neck pain, nor any change in his lower extremity strength or bowel/bladder control.  With the exception of activity he has been unable to identify any exacerbating factors. Shaking his  hands seems to help and he has been wearing splints at night with some improvement.  NCS/EMG performed on 10/9/2019 confirmed changes consistent with a bilateral median neuropathy worse on the right.  He remains uninterested in pursuing a surgical assessment at present    Labs  Most recent vitamin D 15.9.  It is unclear whether he is taking high-dose supplementation consistently  Lab Results   Component Value Date    GLUCOSE 100 (H) 07/27/2023    BUN 9 07/27/2023    CREATININE 0.66 (L) 07/27/2023    EGFR 115.0 07/27/2023    BCR 13.6 07/27/2023    K 4.1 07/27/2023    CO2 27.0 07/27/2023    CALCIUM 9.3 07/27/2023    ALBUMIN 4.2 07/27/2023    BILITOT 0.6 07/27/2023    AST 26 07/27/2023    ALT 30 07/27/2023     Lab Results   Component Value Date    ALKPHOS 74 07/27/2023     The following portions of the patient's history were reviewed and updated as appropriate: allergies, current medications, past medical history, past social history, and problem list.    Review of Systems   Constitutional:  Positive for fatigue. Negative for appetite change, chills and fever.   HENT:  Negative for congestion, ear pain, rhinorrhea, sneezing and sore throat.    Eyes:  Negative for visual disturbance.   Respiratory:  Positive for shortness of breath. Negative for cough and wheezing.    Cardiovascular:  Positive for leg swelling. Negative for chest pain and palpitations.   Gastrointestinal:  Negative for abdominal pain, blood in stool, constipation, diarrhea, nausea and vomiting.   Endocrine: Negative for polydipsia and polyuria.        Intermittent hot flashes   Genitourinary:  Positive for frequency. Negative for difficulty urinating, dysuria, hematuria and urgency.   Musculoskeletal:  Positive for arthralgias and back pain. Negative for joint swelling and neck pain.   Skin:  Negative for rash.   Neurological:  Positive for weakness (both hands) and numbness (both feet). Negative for dizziness, tremors and headaches.        Paresthesias  both hands   Psychiatric/Behavioral:  Positive for decreased concentration. Negative for dysphoric mood and sleep disturbance. The patient is not nervous/anxious.      Objective   Physical Exam  Constitutional:       General: He is not in acute distress.     Appearance: Normal appearance. He is well-developed. He is not ill-appearing or diaphoretic.      Comments: Bright and in fair spirits.  Antalgic gait.  Able to climb onto the exam table with minimal one-person assistance.  No apparent distress at rest.  No pallor, jaundice, diaphoresis, or cyanosis.   HENT:      Head: Atraumatic.      Right Ear: Tympanic membrane, ear canal and external ear normal.      Left Ear: Tympanic membrane, ear canal and external ear normal.   Eyes:      Conjunctiva/sclera: Conjunctivae normal.   Neck:      Thyroid: No thyroid mass or thyromegaly.      Vascular: No carotid bruit or JVD.      Trachea: Trachea normal. No tracheal deviation.   Cardiovascular:      Rate and Rhythm: Normal rate and regular rhythm. Tachycardia present.      Heart sounds: Normal heart sounds, S1 normal and S2 normal. No murmur heard.     No gallop. No S3 or S4 sounds.   Pulmonary:      Effort: Pulmonary effort is normal.      Breath sounds: Normal breath sounds.   Abdominal:      General: Abdomen is protuberant. Bowel sounds are normal.   Musculoskeletal:      Right lower le+ Pitting Edema (with stasis changes) present.      Left lower le+ Pitting Edema (with stasis changes) present.   Lymphadenopathy:      Head:      Right side of head: No submental, submandibular, tonsillar, preauricular, posterior auricular or occipital adenopathy.      Left side of head: No submental, submandibular, tonsillar, preauricular, posterior auricular or occipital adenopathy.      Cervical: No cervical adenopathy.      Upper Body:      Right upper body: No supraclavicular adenopathy.      Left upper body: No supraclavicular adenopathy.   Skin:     General: Skin is warm and  dry.      Coloration: Skin is not cyanotic, jaundiced or pale.      Findings: No rash.      Nails: There is no clubbing.   Neurological:      Mental Status: He is alert and oriented to person, place, and time.      Cranial Nerves: No cranial nerve deficit.      Sensory: Sensory deficit (decreased vibration sense toes of both feet) present.      Motor: No tremor.      Coordination: Coordination normal.      Gait: Gait normal.   Psychiatric:         Attention and Perception: Attention normal.         Mood and Affect: Mood normal.         Speech: Speech normal.         Behavior: Behavior normal.         Thought Content: Thought content normal.       Assessment & Plan   Problems Addressed this Visit          Cardiac and Vasculature    Chronic venous insufficiency  Reminded regarding lifestyle modification    Essential hypertension   Hypertension:  BP remains at goal off medication . Evidence of target organ damage: none.  Encouraged to continue to work on diet and exercise plan.   We will continue to monitor BP and urine microalbumin    Mixed hyperlipidemia   As above.   Continue current medication.    Relevant Medications    atorvastatin (LIPITOR) 80 MG tablet    ezetimibe (ZETIA) 10 MG tablet       Coag and Thromboembolic    History of pulmonary embolism  Continue rivaroxaban prophylaxis    Relevant Medications    rivaroxaban (Xarelto) 20 MG tablet       Endocrine and Metabolic    Androgen deficiency    Class 3 severe obesity with serious comorbidity and body mass index (BMI) greater than or equal to 70 in adult    Type 2 diabetes mellitus with peripheral neuropathy  Diabetes mellitus Type II, under excellent control.   Encouraged to continue to pursue ADA diet  Encouraged aerobic exercise.  Continue current medication  Coverage for a GLP agonist will be checked again in January    Relevant Medications    metFORMIN (GLUCOPHAGE) 500 MG tablet    Vitamin D deficiency  Continue supplementation with monitoring.     Relevant Medications    vitamin D (ERGOCALCIFEROL) 1.25 MG (04463 UT) capsule capsule       Gastrointestinal Abdominal     Gastroesophageal reflux disease without esophagitis   Symptoms are currently well controlled.  Encouraged to report if this should change.  Continue current medication    Relevant Medications    pantoprazole (PROTONIX) 40 MG EC tablet    Paraesophageal hiatal hernia    Relevant Medications    pantoprazole (PROTONIX) 40 MG EC tablet       Genitourinary and Reproductive     Vasculogenic erectile dysfunction       Health Encounters    Healthcare maintenance  Flu shot administered.  Recommended an updated COVID-19 vaccination.  Reviewed the potential benefits of shingrix.  Patient will try to obtain  Reviewed the potential benefits and risks of colon cancer screening.  Patient like to proceed with a Cologuard and this will be arranged    Relevant Orders    Fluzone (or Fluarix & Flulaval for VFC) >6mos (Completed)    Cologuard - Stool, Per Rectum       Hematology and Neoplasia    Iron deficiency anemia due to chronic blood loss  Continue supplementation with monitoring.    Relevant Medications    Ascorbic Acid (vitamin C with kimo hips tablet) 500 MG tablet    ferrous sulfate 325 (65 FE) MG tablet    Pituitary adenoma       Musculoskeletal and Injuries    Bilateral knee pain  Reminded regarding symptomatic treatment.   Continue current medication  Encouraged to follow-up with pain management when he has the opportunity    Relevant Medications    acetaminophen-codeine (TYLENOL with CODEINE #3) 300-30 MG per tablet    gabapentin (NEURONTIN) 300 MG capsule    naproxen (NAPROSYN) 500 MG tablet    Mechanical low back pain  As above.    Relevant Medications    acetaminophen-codeine (TYLENOL with CODEINE #3) 300-30 MG per tablet    gabapentin (NEURONTIN) 300 MG capsule    naproxen (NAPROSYN) 500 MG tablet       Neuro    Bilateral carpal tunnel syndrome  Patient remains uninterested in pursuing anything at  present.  He understands that this might result in weakness that might not entirely resolved with surgery       Sleep    Obstructive sleep apnea     Diagnoses         Codes Comments    Mixed hyperlipidemia    -  Primary ICD-10-CM: E78.2  ICD-9-CM: 272.2     Essential hypertension     ICD-10-CM: I10  ICD-9-CM: 401.9     Chronic venous insufficiency     ICD-10-CM: I87.2  ICD-9-CM: 459.81     History of pulmonary embolism     ICD-10-CM: Z86.711  ICD-9-CM: V12.55     Vitamin D deficiency     ICD-10-CM: E55.9  ICD-9-CM: 268.9     Type 2 diabetes mellitus with peripheral neuropathy     ICD-10-CM: E11.42  ICD-9-CM: 250.60, 357.2     Androgen deficiency     ICD-10-CM: E29.1  ICD-9-CM: 257.2     Paraesophageal hiatal hernia     ICD-10-CM: K44.9  ICD-9-CM: 553.3     Gastroesophageal reflux disease without esophagitis     ICD-10-CM: K21.9  ICD-9-CM: 530.81     Erectile dysfunction due to arterial insufficiency     ICD-10-CM: N52.01  ICD-9-CM: 607.84     Healthcare maintenance     ICD-10-CM: Z00.00  ICD-9-CM: V70.0     Pituitary adenoma     ICD-10-CM: D35.2  ICD-9-CM: 227.3     Iron deficiency anemia due to chronic blood loss     ICD-10-CM: D50.0  ICD-9-CM: 280.0     Mechanical low back pain     ICD-10-CM: M54.59  ICD-9-CM: 724.2     Chronic pain of both knees     ICD-10-CM: M25.561, M25.562, G89.29  ICD-9-CM: 719.46, 338.29     Bilateral carpal tunnel syndrome     ICD-10-CM: G56.03  ICD-9-CM: 354.0     Obstructive sleep apnea     ICD-10-CM: G47.33  ICD-9-CM: 327.23     Encounter for immunization     ICD-10-CM: Z23  ICD-9-CM: V03.89     Encounter for screening for malignant neoplasm of colon     ICD-10-CM: Z12.11  ICD-9-CM: V76.51     Acute pain due to trauma     ICD-10-CM: G89.11  ICD-9-CM: 338.11     Type 2 diabetes mellitus without complication, without long-term current use of insulin     ICD-10-CM: E11.9  ICD-9-CM: 250.00     Urinary frequency     ICD-10-CM: R35.0  ICD-9-CM: 788.41     Class 3 severe obesity with serious  comorbidity and body mass index (BMI) greater than or equal to 70 in adult, unspecified obesity type     ICD-10-CM: E66.01, Z68.45  ICD-9-CM: 278.01, V85.45

## 2023-11-17 ENCOUNTER — OFFICE VISIT (OUTPATIENT)
Dept: FAMILY MEDICINE CLINIC | Facility: CLINIC | Age: 48
End: 2023-11-17
Payer: MEDICARE

## 2023-11-17 DIAGNOSIS — D35.2 PITUITARY ADENOMA: ICD-10-CM

## 2023-11-17 DIAGNOSIS — E11.42 TYPE 2 DIABETES MELLITUS WITH PERIPHERAL NEUROPATHY: ICD-10-CM

## 2023-11-17 DIAGNOSIS — I83.021: ICD-10-CM

## 2023-11-17 DIAGNOSIS — E66.01 CLASS 3 SEVERE OBESITY WITH SERIOUS COMORBIDITY AND BODY MASS INDEX (BMI) GREATER THAN OR EQUAL TO 70 IN ADULT, UNSPECIFIED OBESITY TYPE: ICD-10-CM

## 2023-11-17 DIAGNOSIS — E55.9 VITAMIN D DEFICIENCY: ICD-10-CM

## 2023-11-17 DIAGNOSIS — Z12.12 ENCOUNTER FOR SCREENING FOR MALIGNANT NEOPLASM OF RECTUM: ICD-10-CM

## 2023-11-17 DIAGNOSIS — D50.0 IRON DEFICIENCY ANEMIA DUE TO CHRONIC BLOOD LOSS: ICD-10-CM

## 2023-11-17 DIAGNOSIS — M25.562 CHRONIC PAIN OF BOTH KNEES: ICD-10-CM

## 2023-11-17 DIAGNOSIS — K21.9 GASTROESOPHAGEAL REFLUX DISEASE WITHOUT ESOPHAGITIS: ICD-10-CM

## 2023-11-17 DIAGNOSIS — N52.01 ERECTILE DYSFUNCTION DUE TO ARTERIAL INSUFFICIENCY: ICD-10-CM

## 2023-11-17 DIAGNOSIS — Z00.00 HEALTHCARE MAINTENANCE: ICD-10-CM

## 2023-11-17 DIAGNOSIS — G89.29 CHRONIC PAIN OF BOTH KNEES: ICD-10-CM

## 2023-11-17 DIAGNOSIS — E29.1 ANDROGEN DEFICIENCY: ICD-10-CM

## 2023-11-17 DIAGNOSIS — I10 ESSENTIAL HYPERTENSION: ICD-10-CM

## 2023-11-17 DIAGNOSIS — K44.9 PARAESOPHAGEAL HIATAL HERNIA: ICD-10-CM

## 2023-11-17 DIAGNOSIS — Q55.64 CONCEALED PENIS: ICD-10-CM

## 2023-11-17 DIAGNOSIS — G47.33 OBSTRUCTIVE SLEEP APNEA: ICD-10-CM

## 2023-11-17 DIAGNOSIS — I87.2 CHRONIC VENOUS INSUFFICIENCY: ICD-10-CM

## 2023-11-17 DIAGNOSIS — E78.2 MIXED HYPERLIPIDEMIA: ICD-10-CM

## 2023-11-17 DIAGNOSIS — M25.561 CHRONIC PAIN OF BOTH KNEES: ICD-10-CM

## 2023-11-17 DIAGNOSIS — L97.121: ICD-10-CM

## 2023-11-17 DIAGNOSIS — M54.59 MECHANICAL LOW BACK PAIN: ICD-10-CM

## 2023-11-17 DIAGNOSIS — Z86.711 HISTORY OF PULMONARY EMBOLISM: ICD-10-CM

## 2023-11-17 DIAGNOSIS — G56.03 BILATERAL CARPAL TUNNEL SYNDROME: ICD-10-CM

## 2023-11-17 DIAGNOSIS — I83.93 VARICOSE VEINS OF BOTH LOWER EXTREMITIES, UNSPECIFIED WHETHER COMPLICATED: Primary | ICD-10-CM

## 2023-11-17 NOTE — PROGRESS NOTES
Wiley Stephens is a 48 y.o. male.     Chief Complaint  He returns for a scheduled reassessment of multiple medical problems including    History of Present Illness     Class III Obesity  He has a BMI of 72 complicated by type 2 diabetes mellitus, hyperlipidemia, chronic back and joint pain, gastroesophageal reflux disease, chronic venous insufficiency, recurrent DVT with previous PE, and HEMALATHA.  He is following an appropriate diet but his activities remain quite limited due to his back and joint pain.  He is a high risk candidate for bariatric surgery, and cannot afford a GLP agonist at present    Recurrent DVT/PE  S/P IVC filter. He continues to deny any change in his lower extremity swelling or shortness of breath, and has had no calf pain, chest pain or hemoptysis. He is currently taking rivaroxaban 20 daily    Iron Deficiency Anemia  He has required transfusion of packed red blood cells in the past. He continues to deny any hematochezia, melena, or hematuria.  He remains on ferrous sulfate 325 twice daily.    Pituitary Adenoma  This was discovered incidentally when he was worked up for thrombosed orbital varices. It was felt to be nonsecreting and was stable on several follow-up studies, but he has not undergone neurosurgical reassessment for several years.  He denies any new headaches and has had no visual disturbances.  Due to his size, a local MRI has not been possible    Type 2 Diabetes Mellitus  He admits to paresthesias of the feet.  He continues to deny any visual disturbances, polydipsia, polyuria, hypoglycemia or foot ulcerations.  He does not check his glucose.  He remains on metformin.  He did quite well with semaglutide but is unable to afford it.  His last dilated eye exam was more then one year ago.       Hyperlipidemia  His compliance with treatment has been fair.  He has been trying to follow his diet and get what exercise his back and joint pain permits. He remains on atorvastatin  with no apparent side effects.    Urinary Frequency  He underwent a urology assessment with Dr. Gonzalez and was started on oxybutynin every evening with an improvement. Apart from a dry mouth he has not experienced any side effects.     Joint Pain  He has chronic low back, left shoulder, and bilateral knee pain.  He feels this has worsened with time, but continues to deny any change in the quality or any new associated symptoms.  He remains on naproxen, gabapentin, cyclobenzaprine, and codeine/APAP with fairly good effect and no apparent side effects.  He was referred to pain management at his last visit, but was unable to follow-up with his appointment, and has yet to reschedule    Carpal Tunnel Syndrome  He continues to experience intermittent paresthesias of both hands extending proximally to the mid forearms.  This remains present more than not and has been associated with some reduction in his  strength.  There is no history of any numbness and he continues to deny any new neck pain, nor any change in his lower extremity strength or bowel/bladder control.  With the exception of activity he has been unable to identify any exacerbating factors. Shaking his hands seems to help and he has been wearing splints at night with some improvement.  NCS/EMG performed on 10/9/2019 confirmed changes consistent with a bilateral median neuropathy worse on the right.  He remains uninterested in pursuing a surgical assessment at present    The following portions of the patient's history were reviewed and updated as appropriate: allergies, current medications, past family history, past medical history, past social history, past surgical history, and problem list.    Review of Systems    Objective   Physical Exam    Assessment & Plan   Problems Addressed this Visit          Cardiac and Vasculature    Chronic venous insufficiency    Essential hypertension    Mixed hyperlipidemia    Varicose veins of legs - Primary       Coag and  Thromboembolic    History of pulmonary embolism       Endocrine and Metabolic    Androgen deficiency    Class 3 severe obesity with serious comorbidity and body mass index (BMI) greater than or equal to 70 in adult    Type 2 diabetes mellitus with peripheral neuropathy    Vitamin D deficiency       Gastrointestinal Abdominal     Gastroesophageal reflux disease without esophagitis    Paraesophageal hiatal hernia       Genitourinary and Reproductive     Concealed penis    Vasculogenic erectile dysfunction       Health Encounters    Healthcare maintenance       Hematology and Neoplasia    Iron deficiency anemia due to chronic blood loss    Pituitary adenoma       Musculoskeletal and Injuries    Bilateral knee pain    Mechanical low back pain       Neuro    Bilateral carpal tunnel syndrome       Sleep    Obstructive sleep apnea     Diagnoses         Codes Comments    Varicose veins of both lower extremities, unspecified whether complicated    -  Primary ICD-10-CM: I83.93  ICD-9-CM: 454.9     Mixed hyperlipidemia     ICD-10-CM: E78.2  ICD-9-CM: 272.2     Essential hypertension     ICD-10-CM: I10  ICD-9-CM: 401.9     Chronic venous insufficiency     ICD-10-CM: I87.2  ICD-9-CM: 459.81     History of pulmonary embolism     ICD-10-CM: Z86.711  ICD-9-CM: V12.55     Vitamin D deficiency     ICD-10-CM: E55.9  ICD-9-CM: 268.9     Type 2 diabetes mellitus with peripheral neuropathy     ICD-10-CM: E11.42  ICD-9-CM: 250.60, 357.2     Class 3 severe obesity with serious comorbidity and body mass index (BMI) greater than or equal to 70 in adult, unspecified obesity type     ICD-10-CM: E66.01, Z68.45  ICD-9-CM: 278.01, V85.45     Androgen deficiency     ICD-10-CM: E29.1  ICD-9-CM: 257.2     Paraesophageal hiatal hernia     ICD-10-CM: K44.9  ICD-9-CM: 553.3     Gastroesophageal reflux disease without esophagitis     ICD-10-CM: K21.9  ICD-9-CM: 530.81     Erectile dysfunction due to arterial insufficiency     ICD-10-CM:  N52.01  ICD-9-CM: 607.84     Concealed penis     ICD-10-CM: Q55.64  ICD-9-CM: 752.65     Healthcare maintenance     ICD-10-CM: Z00.00  ICD-9-CM: V70.0     Pituitary adenoma     ICD-10-CM: D35.2  ICD-9-CM: 227.3     Iron deficiency anemia due to chronic blood loss     ICD-10-CM: D50.0  ICD-9-CM: 280.0     Mechanical low back pain     ICD-10-CM: M54.59  ICD-9-CM: 724.2     Chronic pain of both knees     ICD-10-CM: M25.561, M25.562, G89.29  ICD-9-CM: 719.46, 338.29     Bilateral carpal tunnel syndrome     ICD-10-CM: G56.03  ICD-9-CM: 354.0     Obstructive sleep apnea     ICD-10-CM: G47.33  ICD-9-CM: 327.23

## 2023-11-18 VITALS
RESPIRATION RATE: 15 BRPM | HEIGHT: 66 IN | DIASTOLIC BLOOD PRESSURE: 70 MMHG | TEMPERATURE: 98.6 F | WEIGHT: 315 LBS | BODY MASS INDEX: 50.62 KG/M2 | OXYGEN SATURATION: 93 % | HEART RATE: 100 BPM | SYSTOLIC BLOOD PRESSURE: 126 MMHG

## 2023-11-18 PROBLEM — L97.121: Status: ACTIVE | Noted: 2023-11-18

## 2023-11-18 PROBLEM — I83.021: Status: ACTIVE | Noted: 2023-11-18

## 2023-11-18 NOTE — PATIENT INSTRUCTIONS
Advance Directive    Advance directives are legal documents that allow you to make decisions about your health care and medical treatment in case you become unable to communicate for yourself. Advance directives let your wishes be known to family, friends, and health care providers.  Discussing and writing advance directives should happen over time rather than all at once. Advance directives can be changed and updated at any time. There are different types of advance directives, such as:  Medical power of .  Living will.  Do not resuscitate (DNR) order or do not attempt resuscitation (DNAR) order.  Health care proxy and medical power of   A health care proxy is also called a health care agent. This person is appointed to make medical decisions for you when you are unable to make decisions for yourself. Generally, people ask a trusted friend or family member to act as their proxy and represent their preferences. Make sure you have an agreement with your trusted person to act as your proxy. A proxy may have to make a medical decision on your behalf if your wishes are not known.  A medical power of , also called a durable power of  for health care, is a legal document that names your health care proxy. Depending on the laws in your state, the document may need to be:  Signed.  Notarized.  Dated.  Copied.  Witnessed.  Incorporated into your medical record.  You may also want to appoint a trusted person to manage your money in the event you are unable to do so. This is called a durable power of  for finances. It is a separate legal document from the durable power of  for health care. You may choose your health care proxy or someone different to act as your agent in money matters.  If you do not appoint a proxy, or there is a concern that the proxy is not acting in your best interest, a court may appoint a guardian to act on your behalf.  Living will  A living will is a set  of instructions that state your wishes about medical care when you cannot express them yourself. Health care providers should keep a copy of your living will in your medical record. You may want to give a copy to family members or friends. To alert caregivers in case of an emergency, you can place a card in your wallet to let them know that you have a living will and where they can find it. A living will is used if you become:  Terminally ill.  Disabled.  Unable to communicate or make decisions.  The following decisions should be included in your living will:  To use or not to use life support equipment, such as dialysis machines and breathing machines (ventilators).  Whether you want a DNR or DNAR order. This tells health care providers not to use cardiopulmonary resuscitation (CPR) if breathing or heartbeat stops.  To use or not to use tube feeding.  To be given or not to be given food and fluids.  Whether you want comfort (palliative) care when the goal becomes comfort rather than a cure.  Whether you want to donate your organs and tissues.  A living will does not give instructions for distributing your money and property if you should pass away.  DNR or DNAR  A DNR or DNAR order is a request not to have CPR in the event that your heart stops beating or you stop breathing. If a DNR or DNAR order has not been made and shared, a health care provider will try to help any patient whose heart has stopped or who has stopped breathing. If you plan to have surgery, talk with your health care provider about how your DNR or DNAR order will be followed if problems occur.  What if I do not have an advance directive?  Some states assign family decision makers to act on your behalf if you do not have an advance directive. Each state has its own laws about advance directives. You may want to check with your health care provider, , or state representative about the laws in your state.  Summary  Advance directives are  legal documents that allow you to make decisions about your health care and medical treatment in case you become unable to communicate for yourself.  The process of discussing and writing advance directives should happen over time. You can change and update advance directives at any time.  Advance directives may include a medical power of , a living will, and a DNR or DNAR order.  This information is not intended to replace advice given to you by your health care provider. Make sure you discuss any questions you have with your health care provider.  Document Revised: 09/21/2021 Document Reviewed: 09/21/2021  [employer] Patient Education © 2023 [employer]  Fall Prevention in the Home, Adult  Falls can cause injuries and can happen to people of all ages. There are many things you can do to make your home safe and to help prevent falls. Ask for help when making these changes.  What actions can I take to prevent falls?  General Instructions  Use good lighting in all rooms. Replace any light bulbs that burn out.  Turn on the lights in dark areas. Use night-lights.  Keep items that you use often in easy-to-reach places. Lower the shelves around your home if needed.  Set up your furniture so you have a clear path. Avoid moving your furniture around.  Do not have throw rugs or other things on the floor that can make you trip.  Avoid walking on wet floors.  If any of your floors are uneven, fix them.  Add color or contrast paint or tape to clearly kvng and help you see:  Grab bars or handrails.  First and last steps of staircases.  Where the edge of each step is.  If you use a stepladder:  Make sure that it is fully opened. Do not climb a closed stepladder.  Make sure the sides of the stepladder are locked in place.  Ask someone to hold the stepladder while you use it.  Know where your pets are when moving through your home.  What can I do in the bathroom?         Keep the floor dry. Clean up any water on the floor  right away.  Remove soap buildup in the tub or shower.  Use nonskid mats or decals on the floor of the tub or shower.  Attach bath mats securely with double-sided, nonslip rug tape.  If you need to sit down in the shower, use a plastic, nonslip stool.  Install grab bars by the toilet and in the tub and shower. Do not use towel bars as grab bars.  What can I do in the bedroom?  Make sure that you have a light by your bed that is easy to reach.  Do not use any sheets or blankets for your bed that hang to the floor.  Have a firm chair with side arms that you can use for support when you get dressed.  What can I do in the kitchen?  Clean up any spills right away.  If you need to reach something above you, use a step stool with a grab bar.  Keep electrical cords out of the way.  Do not use floor polish or wax that makes floors slippery.  What can I do with my stairs?  Do not leave any items on the stairs.  Make sure that you have a light switch at the top and the bottom of the stairs.  Make sure that there are handrails on both sides of the stairs. Fix handrails that are broken or loose.  Install nonslip stair treads on all your stairs.  Avoid having throw rugs at the top or bottom of the stairs.  Choose a carpet that does not hide the edge of the steps on the stairs.  Check carpeting to make sure that it is firmly attached to the stairs. Fix carpet that is loose or worn.  What can I do on the outside of my home?  Use bright outdoor lighting.  Fix the edges of walkways and driveways and fix any cracks.  Remove anything that might make you trip as you walk through a door, such as a raised step or threshold.  Trim any bushes or trees on paths to your home.  Check to see if handrails are loose or broken and that both sides of all steps have handrails.  Install guardrails along the edges of any raised decks and porches.  Clear paths of anything that can make you trip, such as tools or rocks.  Have leaves, snow, or ice  cleared regularly.  Use sand or salt on paths during winter.  Clean up any spills in your garage right away. This includes grease or oil spills.  What other actions can I take?  Wear shoes that:  Have a low heel. Do not wear high heels.  Have rubber bottoms.  Feel good on your feet and fit well.  Are closed at the toe. Do not wear open-toe sandals.  Use tools that help you move around if needed. These include:  Canes.  Walkers.  Scooters.  Crutches.  Review your medicines with your doctor. Some medicines can make you feel dizzy. This can increase your chance of falling.  Ask your doctor what else you can do to help prevent falls.  Where to find more information  Centers for Disease Control and Prevention, [place]: www.cdc.gov  National [place] on Aging: www.puma.nih.gov  Contact a doctor if:  You are afraid of falling at home.  You feel weak, drowsy, or dizzy at home.  You fall at home.  Summary  There are many simple things that you can do to make your home safe and to help prevent falls.  Ways to make your home safe include removing things that can make you trip and installing grab bars in the bathroom.  Ask for help when making these changes in your home.  This information is not intended to replace advice given to you by your health care provider. Make sure you discuss any questions you have with your health care provider.  Document Revised: 09/19/2022 Document Reviewed: 07/21/2021  [employer] Patient Education © 2023 [employer]  Colorectal Cancer Screening    Colorectal cancer screening is a group of tests that are used to check for colorectal cancer before symptoms develop. Colorectal refers to the colon and rectum. The colon and rectum are located at the end of the digestive tract and carry stool (feces) out of the body.  Who should have screening?  All adults who are 45-75 years old should have screening. Your health care provider may recommend screening before age 45. You will have tests every 1-10 years,  depending on your results and the type of screening test. Screening recommendations for adults who are 76-85 years old vary depending on a person's health. People older than age 85 should no longer get colorectal cancer screening.  You may have screening tests starting before age 45, or more often than other people, if you have any of these risk factors:  A personal or family history of colorectal cancer or abnormal growths known as polyps in your colon.  Inflammatory bowel disease, such as ulcerative colitis or Crohn's disease.  A history of having radiation treatment to the abdomen or the area between the hip bones (pelvic area) for cancer.  A type of genetic syndrome that is passed from parent to child (hereditary), such as:  Robles syndrome.  Familial adenomatous polyposis.  Turcot syndrome.  Peutz-Jeghers syndrome.  MUTYH-associated polyposis (MAP).  A personal history of diabetes.  Types of tests  There are several types of colorectal screening tests. You may have one or more of the following:  Guaiac-based fecal occult blood testing. For this test, a stool sample is checked for hidden (occult) blood, which could be a sign of colorectal cancer.  Fecal immunochemical test (FIT). For this test, a stool sample is checked for blood, which could be a sign of colorectal cancer.  Stool DNA test. For this test, a stool sample is checked for blood and changes in DNA that could lead to colorectal cancer.  Sigmoidoscopy. During this test, a thin, flexible tube with a camera on the end, called a sigmoidoscope, is used to examine the rectum and the lower colon.  Colonoscopy. During this test, a long, flexible tube with a camera on the end, called a colonoscope, is used to examine the entire colon and rectum. Also, sometimes a tissue sample is taken to be looked at under a microscope (biopsy) or small polyps are removed during this test.  Virtual colonoscopy. Instead of a colonoscope, this type of colonoscopy uses a CT scan  to take pictures of the colon and rectum. A CT scan is a type of X-ray that is made using computers.  What are the benefits of screening?  Screening reduces your risk for colorectal cancer and can help identify cancer at an early stage, when the cancer can be removed or treated more easily. It is common for polyps to form in the lining of the colon, especially as you age. These polyps may be cancerous or become cancerous over time. Screening can identify these polyps.  What are the risks of screening?  Generally, these are safe tests. However, problems may occur, including:  The need for more tests to confirm results from a stool sample test. Stool sample tests have fewer risks than other types of screening tests.  Being exposed to low levels of radiation, if you had a test involving X-rays. This may slightly increase your cancer risk. The benefit of detecting cancer outweighs the slight increase in risk.  Bleeding, damage to the intestine, or infection caused by a sigmoidoscopy or colonoscopy.  A reaction to medicines given during a sigmoidoscopy or colonoscopy.  Talk with your health care provider to understand your risk for colorectal cancer and to make a screening plan that is right for you.  Questions to ask your health care provider  When should I start colorectal cancer screening?  What is my risk for colorectal cancer?  How often do I need screening?  Which screening tests do I need?  How do I get my test results?  What do my results mean?  Where to find more information  Learn more about colorectal cancer screening from:  The American Cancer Society: cancer.org  National Cancer [place]: cancer.gov  Summary  Colorectal cancer screening is a group of tests used to check for colorectal cancer before symptoms develop.  All adults who are 45-75 years old should have screening. Your health care provider may recommend screening before age 45.  You may have screening tests starting before age 45, or more often  than other people, if you have certain risk factors.  Screening reduces your risk for colorectal cancer and can help identify cancer at an early stage, when the cancer can be removed or treated more easily.  Talk with your health care provider to understand your risk for colorectal cancer and to make a screening plan that is right for you.  This information is not intended to replace advice given to you by your health care provider. Make sure you discuss any questions you have with your health care provider.  Document Revised: 04/07/2021 Document Reviewed: 04/07/2021  [employer] Patient Education © 2023 [employer]

## 2023-11-18 NOTE — PROGRESS NOTES
The ABCs of the Annual Wellness Visit  Subsequent Medicare Wellness Visit    Chief Complaint  Subsequent Medicare Wellness Visit    Subjective    History of Present Illness:  Jorge Stephens is a 48 y.o. male who presents for a Subsequent Medicare Wellness Visit.    Lower Extremity Edema  He has experienced increased lower extremity edema since last here, and has developed several sores that have been draining over his left calf.  He denies any new calf pain or any increase in his shortness of breath.  He denies any cough or hemoptysis and there is no history of any fever or chills.    Recurrent DVT/PE  S/P IVC filter. He continues to deny any change in his shortness of breath, and has had no calf pain, chest pain or hemoptysis. He is currently taking rivaroxaban 20 daily    Iron Deficiency Anemia  He has required transfusion of packed red blood cells in the past. He continues to deny any hematochezia, melena, or hematuria.  He remains on ferrous sulfate 325 twice daily.    Pituitary Adenoma  This was discovered incidentally when he was worked up for thrombosed orbital varices. It was felt to be nonsecreting and was stable on several follow-up studies, but he has not undergone neurosurgical reassessment for several years.  He denies any new headaches and has had no visual disturbances.  Due to his size, a local MRI has not been possible    Class III Obesity  He has a BMI of 72 complicated by type 2 diabetes mellitus, hyperlipidemia, chronic back and joint pain, gastroesophageal reflux disease, chronic venous insufficiency, recurrent DVT with previous PE, and HEMALATHA.  He is following an appropriate diet but his activities remain quite limited due to his back and joint pain.  He is a high risk candidate for bariatric surgery, and cannot afford a GLP agonist at present    Type 2 Diabetes Mellitus  He admits to paresthesias of the feet.  He continues to deny any visual disturbances, polydipsia, polyuria, hypoglycemia or  foot ulcerations.  He does not check his glucose.  He remains on metformin.  He did quite well with semaglutide but is unable to afford it.  His last dilated eye exam was more then one year ago.       Hyperlipidemia  His compliance with treatment has been fair.  He has been trying to follow his diet and get what exercise his back and joint pain permits. He remains on atorvastatin with no apparent side effects.    Urinary Frequency  He underwent a urology assessment with Dr. Gonzalez and was started on oxybutynin every evening with an improvement. Apart from a dry mouth he has not experienced any side effects.     Joint Pain  He has chronic low back, left shoulder, and bilateral knee pain.  He feels this has worsened with time, but continues to deny any change in the quality or any new associated symptoms.  He remains on naproxen, gabapentin, cyclobenzaprine, and codeine/APAP with fairly good effect and no apparent side effects.  He was referred to pain management at his last visit, but was unable to follow-up with his appointment, and has yet to reschedule    Carpal Tunnel Syndrome  He continues to experience intermittent paresthesias of both hands extending proximally to the mid forearms.  This remains present more than not and has been associated with some reduction in his  strength.  There is no history of any numbness and he continues to deny any new neck pain, nor any change in his lower extremity strength or bowel/bladder control.  With the exception of activity he has been unable to identify any exacerbating factors. Shaking his hands seems to help and he has been wearing splints at night with some improvement.  NCS/EMG performed on 10/9/2019 confirmed changes consistent with a bilateral median neuropathy worse on the right.  He remains uninterested in pursuing a surgical assessment at present    The following portions of the patient's history were reviewed and   updated as appropriate: allergies, current  medications, past family history, past medical history, past social history, past surgical history, and problem list.    Compared to one year ago, the patient feels his physical   health is worse.    Compared to one year ago, the patient feels his mental   health is the same.    Recent Hospitalizations:  He was not admitted to the hospital during the last year.     Current Medical Providers:  Patient Care Team:  Harvey Carrington MD as PCP - General (Family Medicine)    Outpatient Medications Prior to Visit   Medication Sig Dispense Refill    acetaminophen-codeine (TYLENOL with CODEINE #3) 300-30 MG per tablet 1 po bid and 1-2 po qhs 120 tablet 2    Ascorbic Acid (vitamin C with kimo hips tablet) 500 MG tablet 1 po with each dose of ferrous sulfate 180 tablet 1    atorvastatin (LIPITOR) 80 MG tablet Take 1 tablet by mouth Every Night. 90 tablet 3    cyclobenzaprine (FLEXERIL) 10 MG tablet Take 1 tablet by mouth 3 (Three) Times a Day As Needed for Muscle Spasms. 90 tablet 5    ezetimibe (ZETIA) 10 MG tablet Take 1 tablet by mouth Daily. 90 tablet 3    ferrous sulfate 325 (65 FE) MG tablet Take 1 tablet by mouth 2 (Two) Times a Day. 180 tablet 1    gabapentin (NEURONTIN) 300 MG capsule Take 1 capsule by mouth 2 (Two) Times a Day. 60 capsule 2    metFORMIN (GLUCOPHAGE) 500 MG tablet Take 1 tablet by mouth 2 (Two) Times a Day With Meals. 180 tablet 5    naproxen (NAPROSYN) 500 MG tablet Take 1 tablet by mouth 2 (Two) Times a Day As Needed for Mild Pain or Moderate Pain. 60 tablet 5    oxybutynin XL (DITROPAN-XL) 10 MG 24 hr tablet Take 1 tablet by mouth Daily. 90 tablet 3    pantoprazole (PROTONIX) 40 MG EC tablet Take 1 tablet by mouth Daily. 90 tablet 3    rivaroxaban (Xarelto) 20 MG tablet Take 1 tablet by mouth Daily. 112 tablet 0    sildenafil (VIAGRA) 100 MG tablet 1/4-1 po qd prn 10 tablet 5    vitamin D (ERGOCALCIFEROL) 1.25 MG (51622 UT) capsule capsule Take 1 capsule by mouth 1 (One) Time Per Week. 12  capsule 1     No facility-administered medications prior to visit.     Opioid medication/s are on active medication list.  and I have evaluated his active treatment plan and pain score trends (see table).  There were no vitals filed for this visit.  I have reviewed the chart for potential of high risk medication and harmful drug interactions in the elderly.          Aspirin is not on active medication list.  Aspirin use is not indicated based on review of current medical condition/s. Risk of harm outweighs potential benefits.  .    Patient Active Problem List   Diagnosis    Bilateral knee pain    Vasculogenic erectile dysfunction    Gastroesophageal reflux disease without esophagitis    Mixed hyperlipidemia    Essential hypertension    Androgen deficiency    Iron deficiency anemia due to chronic blood loss    Mechanical low back pain    Pituitary adenoma    History of pulmonary embolism    Obstructive sleep apnea    Type 2 diabetes mellitus with peripheral neuropathy    Varicose veins of legs    Chronic venous insufficiency    Encounter for immunization    Healthcare maintenance    Paraesophageal hiatal hernia    Bilateral carpal tunnel syndrome    Concealed penis    Vitamin D deficiency    Class 3 severe obesity with serious comorbidity and body mass index (BMI) greater than or equal to 70 in adult    Venous stasis ulcer of left thigh limited to breakdown of skin with varicose veins     Advance Care Planning  Advance Directive is not on file.  ACP discussion was held with the patient during this visit. Patient does not have an advance directive, information provided.    Review of Systems   Constitutional:  Positive for fatigue. Negative for appetite change, chills and fever.   HENT:  Negative for congestion, ear pain, postnasal drip, rhinorrhea, sneezing, sore throat and voice change.    Eyes:  Negative for visual disturbance.   Respiratory:  Positive for cough and shortness of breath. Negative for wheezing.   "  Cardiovascular:  Positive for leg swelling. Negative for chest pain and palpitations.   Gastrointestinal:  Negative for abdominal pain, blood in stool, constipation, diarrhea, nausea and vomiting.   Genitourinary:  Positive for frequency. Negative for dysuria, hematuria and urgency.   Musculoskeletal:  Positive for arthralgias and back pain. Negative for joint swelling, myalgias and neck pain.   Skin:  Positive for wound (left calf). Negative for rash.   Neurological:  Positive for weakness (hands) and numbness (feet with paresthesias of the hands).   Psychiatric/Behavioral:  Positive for decreased concentration. Negative for dysphoric mood and sleep disturbance. The patient is not nervous/anxious.         Objective    Vitals:    11/17/23 1118   BP: 126/70   Pulse: 100   Resp: 15   Temp: 98.6 °F (37 °C)   TempSrc: Temporal   SpO2: 93%   Weight: (!) 205 kg (451 lb)   Height: 167.6 cm (65.98\")     Estimated body mass index is 72.84 kg/m² as calculated from the following:    Height as of this encounter: 167.6 cm (65.98\").    Weight as of this encounter: 205 kg (451 lb).    Class 3 Severe Obesity (BMI >=40). Obesity-related health conditions include the following: obstructive sleep apnea, hypertension, diabetes mellitus, dyslipidemias, GERD, osteoarthritis, and lower extremity venous stasis disease. Obesity is unchanged. BMI is is above average; BMI management plan is completed. We discussed portion control and increasing exercise.    Does the patient have evidence of cognitive impairment? No    Physical Exam  Constitutional:       General: He is not in acute distress.     Appearance: Normal appearance. He is well-developed. He is not ill-appearing or diaphoretic.      Comments: Bright and in fair spirits.  Antalgic gait.  Able to climb onto the exam table with minimal one-person assistance.  No apparent distress at rest.  No pallor, jaundice, diaphoresis, or cyanosis.   HENT:      Head: Atraumatic.      Right Ear: " Tympanic membrane, ear canal and external ear normal.      Left Ear: Tympanic membrane, ear canal and external ear normal.      Mouth/Throat:      Lips: No lesions.      Mouth: Mucous membranes are moist. No oral lesions.      Pharynx: No oropharyngeal exudate or posterior oropharyngeal erythema.   Eyes:      General: Lids are normal. Gaze aligned appropriately.      Extraocular Movements: Extraocular movements intact.      Conjunctiva/sclera: Conjunctivae normal.      Pupils: Pupils are equal.   Neck:      Thyroid: No thyroid mass or thyromegaly.      Vascular: No carotid bruit or JVD.      Trachea: Trachea normal. No tracheal deviation.   Cardiovascular:      Rate and Rhythm: Regular rhythm. Tachycardia present.      Heart sounds: Normal heart sounds, S1 normal and S2 normal. No murmur heard.     No gallop. No S3 or S4 sounds.      Comments: Several less than 1 cm irregular shallow ulcers draining serous fluid about left calf  Pulmonary:      Effort: Pulmonary effort is normal.      Breath sounds: Normal breath sounds.   Abdominal:      General: Abdomen is protuberant. Bowel sounds are normal. There is no distension or abdominal bruit.      Palpations: Abdomen is soft. There is no hepatomegaly, splenomegaly or mass.      Tenderness: There is no abdominal tenderness.      Hernia: No hernia is present.   Musculoskeletal:      Right lower leg: 3+ Pitting Edema (with significant stasis changes) present.      Left lower leg: 3+ Pitting Edema (with significant stasis changes) present.   Lymphadenopathy:      Head:      Right side of head: No submental, submandibular, tonsillar, preauricular, posterior auricular or occipital adenopathy.      Left side of head: No submental, submandibular, tonsillar, preauricular, posterior auricular or occipital adenopathy.      Cervical: No cervical adenopathy.      Upper Body:      Right upper body: No supraclavicular adenopathy.      Left upper body: No supraclavicular adenopathy.    Skin:     General: Skin is warm and dry.      Coloration: Skin is not cyanotic, jaundiced or pale.      Findings: No rash.      Nails: There is no clubbing.   Neurological:      Mental Status: He is alert and oriented to person, place, and time.      Cranial Nerves: No cranial nerve deficit, dysarthria or facial asymmetry.      Sensory: Sensory deficit (decreased vibration sense toes of both feet) present.      Motor: No tremor.      Coordination: Coordination normal.      Gait: Gait normal.   Psychiatric:         Attention and Perception: Attention normal.         Mood and Affect: Mood normal.         Speech: Speech normal.         Behavior: Behavior normal.         Thought Content: Thought content normal.       HEALTH RISK ASSESSMENT    Smoking Status:  Social History     Tobacco Use   Smoking Status Never    Passive exposure: Never   Smokeless Tobacco Never     Alcohol Consumption:  Social History     Substance and Sexual Activity   Alcohol Use No     Fall Risk Screen:  STEADI Fall Risk Assessment was completed, and patient is at LOW risk for falls.Assessment completed on:2023    Depression Screenin/17/2023    11:15 AM   PHQ-2/PHQ-9 Depression Screening   Little Interest or Pleasure in Doing Things 0-->not at all   Feeling Down, Depressed or Hopeless 0-->not at all   PHQ-9: Brief Depression Severity Measure Score 0     Health Habits and Functional and Cognitive Screenin/17/2023    11:15 AM   Functional & Cognitive Status   Do you have difficulty preparing food and eating? No   Do you have difficulty bathing yourself, getting dressed or grooming yourself? No   Do you have difficulty using the toilet? No   Do you have difficulty moving around from place to place? No   Do you have trouble with steps or getting out of a bed or a chair? No   Current Diet Well Balanced Diet   Dental Exam Not up to date   Eye Exam Not up to date   Exercise (times per week) 0 times per week   Current Exercises  Include No Regular Exercise   Do you need help using the phone?  No   Are you deaf or do you have serious difficulty hearing?  No   Do you need help to go to places out of walking distance? No   Do you need help shopping? No   Do you need help preparing meals?  No   Do you need help with housework?  No   Do you need help with laundry? No   Do you need help taking your medications? No   Do you need help managing money? No   Do you ever drive or ride in a car without wearing a seat belt? No   Have you felt unusual stress, anger or loneliness in the last month? No   Who do you live with? Spouse   If you need help, do you have trouble finding someone available to you? No   Have you been bothered in the last four weeks by sexual problems? No   Do you have difficulty concentrating, remembering or making decisions? No     Age-appropriate Screening Schedule:  Refer to the list below for future screening recommendations based on patient's age, sex and/or medical conditions. Orders for these recommended tests are listed in the plan section. The patient has been provided with a written plan.    Health Maintenance   Topic Date Due    Hepatitis B (1 of 3 - 3-dose series) Never done    COLORECTAL CANCER SCREENING  Never done    DIABETIC EYE EXAM  Never done    DIABETIC FOOT EXAM  10/07/2018    ZOSTER VACCINE (1 of 2) Never done    COVID-19 Vaccine (4 - 2023-24 season) 09/01/2023    BMI FOLLOWUP  11/10/2023    HEMOGLOBIN A1C  01/27/2024    LIPID PANEL  07/27/2024    URINE MICROALBUMIN  07/27/2024    ANNUAL WELLNESS VISIT  11/17/2024    TDAP/TD VACCINES (2 - Td or Tdap) 09/15/2030    HEPATITIS C SCREENING  Completed    Pneumococcal Vaccine 0-64  Completed    INFLUENZA VACCINE  Completed        Assessment & Plan   CMS Preventative Services Quick Reference  Risk Factors Identified During Encounter  Chronic Pain: Natural history and expected course discussed. Questions answered.  Fall Risk-High or Moderate: Discussed Fall Prevention  in the home  Immunizations Discussed/Encouraged: Shingrix and COVID19  The above risks/problems have been discussed with the patient.  Follow up actions/plans if indicated are seen below in the Assessment/Plan Section.  Pertinent information has been shared with the patient in the After Visit Summary.    Diagnoses and all orders for this visit:    1. Varicose veins of both lower extremities, unspecified whether complicated     2. Mixed hyperlipidemia  Encouraged to continue to work on his diet and exercise plan.  Continue current medication    3. Essential hypertension  As above.  Will continue to monitor    4. Chronic venous insufficiency  Reminded regarding lifestyle modification    5. History of pulmonary embolism  Continue rivaroxaban prophylaxis    6. Vitamin D deficiency    7. Type 2 diabetes mellitus with peripheral neuropathy  Diabetes mellitus Type II, under excellent control.   Encouraged to continue to pursue ADA diet  Encouraged aerobic exercise.  Continue current medication    8. Class 3 severe obesity with serious comorbidity and body mass index (BMI) greater than or equal to 70 in adult, unspecified obesity type    9. Androgen deficiency    10. Paraesophageal hiatal hernia    11. Gastroesophageal reflux disease without esophagitis   Symptoms are currently well controlled.  Continue current medication.    12. Erectile dysfunction due to arterial insufficiency    13. Concealed penis    14. Healthcare maintenance  Patient has already received a flu shot fall.  Recommended an updated COVID-19 vaccination.  Reminded that he is due for Shingrix  Will arrange cologuard    15. Pituitary adenoma    16. Iron deficiency anemia due to chronic blood loss  Continue supplementation with monitoring    17. Mechanical low back pain  Reminded regarding symptomatic treatment.   Continue current medication  Encouraged to report if any worse or if any new symptoms or concerns    18. Chronic pain of both knees  As  above    19. Bilateral carpal tunnel syndrome    20. Obstructive sleep apnea    21. Venous stasis ulcer of left thigh limited to breakdown of skin with varicose veins  New problem  Compression dressing applied to the left lower extremity  Home health will be arranged to continue  Will try to obtain a pneumatic compression device        Follow Up:   Return if symptoms worsen or fail to improve, for Next scheduled follow up.     An After Visit Summary and PPPS were made available to the patient.

## 2023-11-21 ENCOUNTER — TELEPHONE (OUTPATIENT)
Dept: FAMILY MEDICINE CLINIC | Facility: CLINIC | Age: 48
End: 2023-11-21
Payer: MEDICARE

## 2023-11-21 NOTE — TELEPHONE ENCOUNTER
----- Message from Harvey Carrington MD sent at 11/21/2023  9:19 AM EST -----  K - will leave a script with you  Thanks    ----- Message -----  From: Christy Denton MA  Sent: 11/21/2023   9:00 AM EST  To: Harvey Carrington MD    I am thinking Miguel Ritcelia      ----- Message -----  From: Harvey Carrington MD  Sent: 11/18/2023  11:35 AM EST  To: Christy Denton MA    Please try to find out where we might get him a pneumatic compression device         Statement Selected

## 2024-01-05 ENCOUNTER — TELEPHONE (OUTPATIENT)
Dept: FAMILY MEDICINE CLINIC | Facility: CLINIC | Age: 49
End: 2024-01-05

## 2024-01-05 NOTE — TELEPHONE ENCOUNTER
Caller: Laila Stephens    Relationship: Emergency Contact    Best call back number: [phone]    What form or medical record are you requesting: LETTER FOR APARTMENT COMPLEX    Who is requesting this form or medical record from you: PATIENT    Timeframe paperwork needed: ASAP    Additional notes: PATIENT IS MOVING AND NEEDS A LETTER STATING HE CAN NOT GO UP AND DOWN THE STAIRS. PLEASE

## 2024-02-20 ENCOUNTER — OFFICE VISIT (OUTPATIENT)
Dept: FAMILY MEDICINE CLINIC | Facility: CLINIC | Age: 49
End: 2024-02-20
Payer: MEDICARE

## 2024-02-20 DIAGNOSIS — M25.561 CHRONIC PAIN OF BOTH KNEES: ICD-10-CM

## 2024-02-20 DIAGNOSIS — M25.562 CHRONIC PAIN OF BOTH KNEES: ICD-10-CM

## 2024-02-20 DIAGNOSIS — D35.2 PITUITARY ADENOMA: ICD-10-CM

## 2024-02-20 DIAGNOSIS — N52.01 ERECTILE DYSFUNCTION DUE TO ARTERIAL INSUFFICIENCY: ICD-10-CM

## 2024-02-20 DIAGNOSIS — Z86.711 HISTORY OF PULMONARY EMBOLISM: ICD-10-CM

## 2024-02-20 DIAGNOSIS — K21.9 GASTROESOPHAGEAL REFLUX DISEASE WITHOUT ESOPHAGITIS: ICD-10-CM

## 2024-02-20 DIAGNOSIS — K44.9 PARAESOPHAGEAL HIATAL HERNIA: ICD-10-CM

## 2024-02-20 DIAGNOSIS — G56.03 BILATERAL CARPAL TUNNEL SYNDROME: ICD-10-CM

## 2024-02-20 DIAGNOSIS — D50.0 IRON DEFICIENCY ANEMIA DUE TO CHRONIC BLOOD LOSS: ICD-10-CM

## 2024-02-20 DIAGNOSIS — E66.01 CLASS 3 SEVERE OBESITY WITH SERIOUS COMORBIDITY AND BODY MASS INDEX (BMI) GREATER THAN OR EQUAL TO 70 IN ADULT, UNSPECIFIED OBESITY TYPE: ICD-10-CM

## 2024-02-20 DIAGNOSIS — Z79.899 HIGH RISK MEDICATION USE: ICD-10-CM

## 2024-02-20 DIAGNOSIS — E11.9 TYPE 2 DIABETES MELLITUS WITHOUT COMPLICATION, WITHOUT LONG-TERM CURRENT USE OF INSULIN: ICD-10-CM

## 2024-02-20 DIAGNOSIS — G89.29 CHRONIC PAIN OF BOTH KNEES: ICD-10-CM

## 2024-02-20 DIAGNOSIS — E55.9 VITAMIN D DEFICIENCY: ICD-10-CM

## 2024-02-20 DIAGNOSIS — I87.2 CHRONIC VENOUS INSUFFICIENCY: ICD-10-CM

## 2024-02-20 DIAGNOSIS — I10 ESSENTIAL HYPERTENSION: ICD-10-CM

## 2024-02-20 DIAGNOSIS — E78.2 MIXED HYPERLIPIDEMIA: Primary | ICD-10-CM

## 2024-02-20 DIAGNOSIS — M54.59 MECHANICAL LOW BACK PAIN: ICD-10-CM

## 2024-02-20 DIAGNOSIS — G47.33 OBSTRUCTIVE SLEEP APNEA: ICD-10-CM

## 2024-02-20 DIAGNOSIS — R35.0 URINARY FREQUENCY: ICD-10-CM

## 2024-02-20 DIAGNOSIS — G89.11 ACUTE PAIN DUE TO TRAUMA: ICD-10-CM

## 2024-02-20 DIAGNOSIS — E11.42 TYPE 2 DIABETES MELLITUS WITH PERIPHERAL NEUROPATHY: ICD-10-CM

## 2024-02-20 DIAGNOSIS — Z00.00 HEALTHCARE MAINTENANCE: ICD-10-CM

## 2024-02-20 PROCEDURE — 36415 COLL VENOUS BLD VENIPUNCTURE: CPT | Performed by: GENERAL PRACTICE

## 2024-02-20 PROCEDURE — 84443 ASSAY THYROID STIM HORMONE: CPT | Performed by: GENERAL PRACTICE

## 2024-02-20 PROCEDURE — 83540 ASSAY OF IRON: CPT | Performed by: GENERAL PRACTICE

## 2024-02-20 PROCEDURE — 83036 HEMOGLOBIN GLYCOSYLATED A1C: CPT | Performed by: GENERAL PRACTICE

## 2024-02-20 PROCEDURE — 82306 VITAMIN D 25 HYDROXY: CPT | Performed by: GENERAL PRACTICE

## 2024-02-20 PROCEDURE — 84466 ASSAY OF TRANSFERRIN: CPT | Performed by: GENERAL PRACTICE

## 2024-02-20 PROCEDURE — 80061 LIPID PANEL: CPT | Performed by: GENERAL PRACTICE

## 2024-02-20 PROCEDURE — 80053 COMPREHEN METABOLIC PANEL: CPT | Performed by: GENERAL PRACTICE

## 2024-02-20 PROCEDURE — 82043 UR ALBUMIN QUANTITATIVE: CPT | Performed by: GENERAL PRACTICE

## 2024-02-20 PROCEDURE — 82607 VITAMIN B-12: CPT | Performed by: GENERAL PRACTICE

## 2024-02-20 PROCEDURE — 82728 ASSAY OF FERRITIN: CPT | Performed by: GENERAL PRACTICE

## 2024-02-20 PROCEDURE — 85025 COMPLETE CBC W/AUTO DIFF WBC: CPT | Performed by: GENERAL PRACTICE

## 2024-02-20 RX ORDER — SILDENAFIL 100 MG/1
TABLET, FILM COATED ORAL
Qty: 10 TABLET | Refills: 5 | Status: SHIPPED | OUTPATIENT
Start: 2024-02-20

## 2024-02-20 NOTE — PROGRESS NOTES
Wiley Stephens is a 48 y.o. male.     Chief Complaint  He returns for a scheduled reassessment of multiple medical problems including chronic venous insufficiency, previous DVT/PE, recurrent iron deficiency anemia, pituitary adenoma, class III obesity, type 2 diabetes mellitus, hyperlipidemia, joint pain, marital disruption    History of Present Illness     Marital Disruption  He and his wife  since he was last here.  This has been collegial, and they continue to live together as he prepares to move back to [place].  While sad about the situation, he feels he is managing and has a supportive family.    Lower Extremity Edema  He has had some improvement in his lower extremity edema since last here and denies any recent drainage.  He continues to deny any new calf pain or any increase in his shortness of breath.  He continues to deny any cough or hemoptysis and there is no history of any fever or chills.  He has yet to receive a pneumatic compression device    Recurrent DVT/PE  S/P IVC filter. He continues to deny any change in his shortness of breath, and has had no calf pain, chest pain or hemoptysis. He ran out of rivaroxaban since last here.  He is quite aware of the dangers of of another PE    Iron Deficiency Anemia  He has required transfusion of packed red blood cells in the past. He continues to deny any hematochezia, melena, or hematuria.  He remains on ferrous sulfate 325 twice daily.    Pituitary Adenoma  This was discovered incidentally when he was worked up for thrombosed orbital varices. It was felt to be nonsecreting and was stable on several follow-up studies, but he has not undergone neurosurgical reassessment for a long time.  He continues to deny any new headaches and has had no visual disturbances.      Class III Obesity  He has a BMI over 72 complicated by type 2 diabetes mellitus, hyperlipidemia, chronic back and joint pain, gastroesophageal reflux disease, chronic venous  insufficiency, recurrent DVT with previous PE, and HEMALATHA.  He is following an appropriate diet but his activities remain quite limited due to his back and joint pain.  He is a high risk candidate for bariatric surgery, and cannot afford a GLP agonist at present    Type 2 Diabetes Mellitus  He admits to paresthesias of the feet.  He continues to deny any visual disturbances, polydipsia, polyuria, hypoglycemia or foot ulcerations.  He does not check his glucose.  He remains on metformin.  He did quite well with semaglutide but is unable to afford it.  He has not had any recent labs.  His last dilated eye exam was more then one year ago.       Hyperlipidemia  His compliance with treatment has been fair.  He has been trying to follow his diet and get what exercise his back and joint pain permits. He remains on atorvastatin with no apparent side effects.    Urinary Frequency  He underwent a urology assessment with Dr. Gonzalez and was started on oxybutynin every evening with an improvement. Apart from a dry mouth he has not experienced any side effects.     Joint Pain  He has chronic low back, left shoulder, and bilateral knee pain.  He feels this has worsened with time, but continues to deny any change in the quality or any new associated symptoms.  He remains on naproxen, gabapentin, cyclobenzaprine, and codeine/APAP with fairly good effect and no apparent side effects.  He was referred to pain management but is uninterested in pursuing this at present    Carpal Tunnel Syndrome  He continues to experience intermittent paresthesias of both hands extending proximally to the mid forearms.  This remains present more than not and has been associated with some reduction in his  strength.  There is no history of any numbness and he continues to deny any new neck pain, nor any change in his lower extremity strength or bowel/bladder control.  With the exception of activity he has been unable to identify any exacerbating  factors. Shaking his hands seems to help and he has been wearing splints at night with some improvement.  NCS/EMG performed on 10/9/2019 confirmed changes consistent with a bilateral median neuropathy worse on the right.  He remains uninterested in pursuing a surgical assessment at present    The following portions of the patient's history were reviewed and updated as appropriate: allergies, current medications, past medical history, past social history, and problem list.    Review of Systems   Constitutional:  Positive for fatigue. Negative for chills and fever.   HENT:  Negative for congestion, dental problem, ear pain, rhinorrhea, sneezing and sore throat.    Respiratory:  Positive for shortness of breath. Negative for cough and wheezing.    Cardiovascular:  Positive for leg swelling. Negative for chest pain and palpitations.   Gastrointestinal:  Negative for abdominal pain, blood in stool, constipation, diarrhea, nausea and vomiting.   Genitourinary:  Positive for frequency and erectile dysfunction. Negative for dysuria, hematuria and urgency.   Musculoskeletal:  Positive for arthralgias and back pain. Negative for gait problem, joint swelling, myalgias, neck pain and neck stiffness.   Skin:  Positive for rash (lower extremities).   Neurological:  Positive for weakness (hands) and numbness (feet with intermittent paresthesias of the hands). Negative for headache.   Psychiatric/Behavioral:  Positive for decreased concentration, dysphoric mood and sleep disturbance. Negative for behavioral problems and suicidal ideas. The patient is not nervous/anxious.      Objective   Physical Exam  Constitutional:       General: He is not in acute distress.     Appearance: Normal appearance. He is well-developed. He is not ill-appearing or diaphoretic.      Comments: Bright and in fair spirits.  Antalgic gait.  Able to climb onto the exam table with minimal one-person assistance.  No apparent distress at rest.  No pallor,  jaundice, diaphoresis, or cyanosis.   HENT:      Head: Atraumatic.      Right Ear: Tympanic membrane, ear canal and external ear normal.      Left Ear: Tympanic membrane, ear canal and external ear normal.   Eyes:      Conjunctiva/sclera: Conjunctivae normal.   Neck:      Thyroid: No thyroid mass or thyromegaly.      Vascular: No carotid bruit or JVD.      Trachea: Trachea normal. No tracheal deviation.   Cardiovascular:      Rate and Rhythm: Normal rate and regular rhythm.      Heart sounds: Normal heart sounds, S1 normal and S2 normal. No murmur heard.     No gallop. No S3 or S4 sounds.   Pulmonary:      Effort: Pulmonary effort is normal.      Breath sounds: Normal breath sounds.   Abdominal:      General: Abdomen is protuberant. Bowel sounds are normal.   Musculoskeletal:      Right lower le+ Pitting Edema (with stasis changes) present.      Left lower le+ Pitting Edema (with stasis changes) present.   Lymphadenopathy:      Head:      Right side of head: No submental, submandibular, tonsillar, preauricular, posterior auricular or occipital adenopathy.      Left side of head: No submental, submandibular, tonsillar, preauricular, posterior auricular or occipital adenopathy.      Cervical: No cervical adenopathy.      Upper Body:      Right upper body: No supraclavicular adenopathy.      Left upper body: No supraclavicular adenopathy.   Skin:     General: Skin is warm and dry.      Coloration: Skin is not cyanotic, jaundiced or pale.      Findings: No rash.      Nails: There is no clubbing.   Neurological:      Mental Status: He is alert and oriented to person, place, and time.      Cranial Nerves: No cranial nerve deficit.      Sensory: Sensory deficit (decreased vibration sense toes of both feet) present.      Motor: No tremor.      Coordination: Coordination normal.      Gait: Gait normal.   Psychiatric:         Attention and Perception: Attention normal.         Mood and Affect: Mood normal.          Speech: Speech normal.         Behavior: Behavior normal.         Thought Content: Thought content normal.       Assessment & Plan   Problems Addressed this Visit          Cardiac and Vasculature    Chronic venous insufficiency  Severe with previous stasis ulcers   Reminded regarding lifestyle modification  Will try again to obtain a pneumatic compression device    Essential hypertension   Hypertension:  BP remains at goal off medication . Evidence of target organ damage: none.  Encouraged to continue to work on diet and exercise plan.   Will continue to monitor BP and urine microalbumin levels    Relevant Orders    CBC & Differential (Completed)    Comprehensive Metabolic Panel (Completed)    TSH (Completed)    Mixed hyperlipidemia  As above.   Continue current medication.    Relevant Medications    atorvastatin (LIPITOR) 80 MG tablet    ezetimibe (ZETIA) 10 MG tablet    Other Relevant Orders    Comprehensive Metabolic Panel (Completed)    Lipid Panel (Completed)    TSH (Completed)       Coag and Thromboembolic    History of pulmonary embolism  Recurrent  Reminded of the importance of continued anticoagulation    Relevant Medications    rivaroxaban (Xarelto) 10 MG tablet       Endocrine and Metabolic    Class 3 severe obesity with serious comorbidity and body mass index (BMI) greater than or equal to 70 in adult    Type 2 diabetes mellitus with peripheral neuropathy  Diabetes mellitus Type II, under unknown control.   Encouraged to continue to pursue ADA diet  Encouraged aerobic exercise.  Continue current medication  Updated labs drawn.    Relevant Medications    metFORMIN (GLUCOPHAGE) 500 MG tablet    Other Relevant Orders    Comprehensive Metabolic Panel (Completed)    TSH (Completed)    Hemoglobin A1c (Completed)    Vitamin B12 (Completed)    MicroAlbumin, Urine, Random - Urine, Clean Catch (Completed)    Vitamin D deficiency    Relevant Medications    vitamin D (ERGOCALCIFEROL) 1.25 MG (47080 UT) capsule  capsule    Other Relevant Orders    Vitamin D,25-Hydroxy (Completed)       Gastrointestinal Abdominal     Gastroesophageal reflux disease without esophagitis    Relevant Medications    pantoprazole (PROTONIX) 40 MG EC tablet    Other Relevant Orders    CBC & Differential (Completed)    Paraesophageal hiatal hernia    Relevant Medications    pantoprazole (PROTONIX) 40 MG EC tablet       Genitourinary and Reproductive     Vasculogenic erectile dysfunction    Relevant Medications    sildenafil (VIAGRA) 100 MG tablet       Health Encounters    Healthcare maintenance  Encouraged to follow-up with [facility]  Reminded that he is due for Shingrix and an updated COVID-19 shot       Hematology and Neoplasia    Iron deficiency anemia due to chronic blood loss  Continue supplementation with monitoring.    Relevant Medications    Ascorbic Acid (vitamin C with kimo hips tablet) 500 MG tablet    ferrous sulfate 325 (65 FE) MG tablet    Pituitary adenoma  Patient uninterested in a neurosurgical reassessment or updated MRI    Relevant Orders    TSH (Completed)    Iron (Completed)    Transferrin (Completed)    Ferritin (Completed)       Musculoskeletal and Injuries    Bilateral knee pain  Reminded regarding symptomatic treatment.   Continue current medication  Encouraged to report if any worse or if any new symptoms or concerns.    Relevant Medications    acetaminophen-codeine (TYLENOL with CODEINE #3) 300-30 MG per tablet    gabapentin (NEURONTIN) 300 MG capsule    naproxen (NAPROSYN) 500 MG tablet    Mechanical low back pain  As above.   Continue current medication.    Relevant Medications    acetaminophen-codeine (TYLENOL with CODEINE #3) 300-30 MG per tablet    gabapentin (NEURONTIN) 300 MG capsule    naproxen (NAPROSYN) 500 MG tablet       Neuro    Bilateral carpal tunnel syndrome       Sleep    Obstructive sleep apnea    Relevant Orders    CBC & Differential (Completed)          Diagnoses         Codes Comments    Mixed  hyperlipidemia    -  Primary ICD-10-CM: E78.2  ICD-9-CM: 272.2     Essential hypertension     ICD-10-CM: I10  ICD-9-CM: 401.9     Chronic venous insufficiency     ICD-10-CM: I87.2  ICD-9-CM: 459.81     History of pulmonary embolism     ICD-10-CM: Z86.711  ICD-9-CM: V12.55     Vitamin D deficiency     ICD-10-CM: E55.9  ICD-9-CM: 268.9     Type 2 diabetes mellitus with peripheral neuropathy     ICD-10-CM: E11.42  ICD-9-CM: 250.60, 357.2     Class 3 severe obesity with serious comorbidity and body mass index (BMI) greater than or equal to 70 in adult, unspecified obesity type     ICD-10-CM: E66.01, Z68.45  ICD-9-CM: 278.01, V85.45     Paraesophageal hiatal hernia     ICD-10-CM: K44.9  ICD-9-CM: 553.3     Gastroesophageal reflux disease without esophagitis     ICD-10-CM: K21.9  ICD-9-CM: 530.81     Erectile dysfunction due to arterial insufficiency     ICD-10-CM: N52.01  ICD-9-CM: 607.84     Healthcare maintenance     ICD-10-CM: Z00.00  ICD-9-CM: V70.0     Pituitary adenoma     ICD-10-CM: D35.2  ICD-9-CM: 227.3     Iron deficiency anemia due to chronic blood loss     ICD-10-CM: D50.0  ICD-9-CM: 280.0     Mechanical low back pain     ICD-10-CM: M54.59  ICD-9-CM: 724.2     Chronic pain of both knees     ICD-10-CM: M25.561, M25.562, G89.29  ICD-9-CM: 719.46, 338.29     Bilateral carpal tunnel syndrome     ICD-10-CM: G56.03  ICD-9-CM: 354.0     Obstructive sleep apnea     ICD-10-CM: G47.33  ICD-9-CM: 327.23     High risk medication use     ICD-10-CM: Z79.899  ICD-9-CM: V58.69     Acute pain due to trauma     ICD-10-CM: G89.11  ICD-9-CM: 338.11     Type 2 diabetes mellitus without complication, without long-term current use of insulin     ICD-10-CM: E11.9  ICD-9-CM: 250.00     Urinary frequency     ICD-10-CM: R35.0  ICD-9-CM: 788.41

## 2024-02-21 VITALS
HEART RATE: 90 BPM | WEIGHT: 315 LBS | RESPIRATION RATE: 15 BRPM | BODY MASS INDEX: 50.62 KG/M2 | OXYGEN SATURATION: 90 % | SYSTOLIC BLOOD PRESSURE: 125 MMHG | HEIGHT: 66 IN | TEMPERATURE: 98 F | DIASTOLIC BLOOD PRESSURE: 64 MMHG

## 2024-02-21 LAB
25(OH)D3 SERPL-MCNC: 12.1 NG/ML (ref 30–100)
ALBUMIN SERPL-MCNC: 3.8 G/DL (ref 3.5–5.2)
ALBUMIN UR-MCNC: 21.3 MG/DL
ALBUMIN/GLOB SERPL: 1.2 G/DL
ALP SERPL-CCNC: 82 U/L (ref 39–117)
ALT SERPL W P-5'-P-CCNC: 31 U/L (ref 1–41)
ANION GAP SERPL CALCULATED.3IONS-SCNC: 6.5 MMOL/L (ref 5–15)
AST SERPL-CCNC: 25 U/L (ref 1–40)
BASOPHILS # BLD AUTO: 0.04 10*3/MM3 (ref 0–0.2)
BASOPHILS NFR BLD AUTO: 0.7 % (ref 0–1.5)
BILIRUB SERPL-MCNC: 0.5 MG/DL (ref 0–1.2)
BUN SERPL-MCNC: 6 MG/DL (ref 6–20)
BUN/CREAT SERPL: 8.5 (ref 7–25)
CALCIUM SPEC-SCNC: 8.9 MG/DL (ref 8.6–10.5)
CHLORIDE SERPL-SCNC: 101 MMOL/L (ref 98–107)
CHOLEST SERPL-MCNC: 222 MG/DL (ref 0–200)
CO2 SERPL-SCNC: 31.5 MMOL/L (ref 22–29)
CREAT SERPL-MCNC: 0.71 MG/DL (ref 0.76–1.27)
DEPRECATED RDW RBC AUTO: 44.8 FL (ref 37–54)
EGFRCR SERPLBLD CKD-EPI 2021: 111.8 ML/MIN/1.73
EOSINOPHIL # BLD AUTO: 0.22 10*3/MM3 (ref 0–0.4)
EOSINOPHIL NFR BLD AUTO: 3.9 % (ref 0.3–6.2)
ERYTHROCYTE [DISTWIDTH] IN BLOOD BY AUTOMATED COUNT: 14.3 % (ref 12.3–15.4)
FERRITIN SERPL-MCNC: 30.9 NG/ML (ref 30–400)
GLOBULIN UR ELPH-MCNC: 3.3 GM/DL
GLUCOSE SERPL-MCNC: 98 MG/DL (ref 65–99)
HBA1C MFR BLD: 6.5 % (ref 4.8–5.6)
HCT VFR BLD AUTO: 44.5 % (ref 37.5–51)
HDLC SERPL-MCNC: 38 MG/DL (ref 40–60)
HGB BLD-MCNC: 14 G/DL (ref 13–17.7)
IMM GRANULOCYTES # BLD AUTO: 0.02 10*3/MM3 (ref 0–0.05)
IMM GRANULOCYTES NFR BLD AUTO: 0.4 % (ref 0–0.5)
IRON 24H UR-MRATE: 44 MCG/DL (ref 59–158)
LDLC SERPL CALC-MCNC: 162 MG/DL (ref 0–100)
LDLC/HDLC SERPL: 4.22 {RATIO}
LYMPHOCYTES # BLD AUTO: 1.23 10*3/MM3 (ref 0.7–3.1)
LYMPHOCYTES NFR BLD AUTO: 22 % (ref 19.6–45.3)
MCH RBC QN AUTO: 27.1 PG (ref 26.6–33)
MCHC RBC AUTO-ENTMCNC: 31.5 G/DL (ref 31.5–35.7)
MCV RBC AUTO: 86.2 FL (ref 79–97)
MONOCYTES # BLD AUTO: 0.59 10*3/MM3 (ref 0.1–0.9)
MONOCYTES NFR BLD AUTO: 10.6 % (ref 5–12)
NEUTROPHILS NFR BLD AUTO: 3.49 10*3/MM3 (ref 1.7–7)
NEUTROPHILS NFR BLD AUTO: 62.4 % (ref 42.7–76)
NRBC BLD AUTO-RTO: 0 /100 WBC (ref 0–0.2)
PLATELET # BLD AUTO: 236 10*3/MM3 (ref 140–450)
PMV BLD AUTO: 11.1 FL (ref 6–12)
POTASSIUM SERPL-SCNC: 4.4 MMOL/L (ref 3.5–5.2)
PROT SERPL-MCNC: 7.1 G/DL (ref 6–8.5)
RBC # BLD AUTO: 5.16 10*6/MM3 (ref 4.14–5.8)
SODIUM SERPL-SCNC: 139 MMOL/L (ref 136–145)
TRANSFERRIN SERPL-MCNC: 328 MG/DL (ref 200–360)
TRIGL SERPL-MCNC: 119 MG/DL (ref 0–150)
TSH SERPL DL<=0.05 MIU/L-ACNC: 2.32 UIU/ML (ref 0.27–4.2)
VIT B12 BLD-MCNC: 349 PG/ML (ref 211–946)
VLDLC SERPL-MCNC: 22 MG/DL (ref 5–40)
WBC NRBC COR # BLD AUTO: 5.59 10*3/MM3 (ref 3.4–10.8)

## 2024-02-21 RX ORDER — ERGOCALCIFEROL 1.25 MG/1
50000 CAPSULE ORAL WEEKLY
Qty: 12 CAPSULE | Refills: 1 | Status: SHIPPED | OUTPATIENT
Start: 2024-02-21

## 2024-02-21 RX ORDER — ATORVASTATIN CALCIUM 80 MG/1
80 TABLET, FILM COATED ORAL NIGHTLY
Qty: 90 TABLET | Refills: 3 | Status: SHIPPED | OUTPATIENT
Start: 2024-02-21

## 2024-02-21 RX ORDER — FERROUS SULFATE 325(65) MG
325 TABLET ORAL 2 TIMES DAILY
Qty: 180 TABLET | Refills: 1 | Status: SHIPPED | OUTPATIENT
Start: 2024-02-21

## 2024-02-21 RX ORDER — GABAPENTIN 300 MG/1
300 CAPSULE ORAL 2 TIMES DAILY
Qty: 60 CAPSULE | Refills: 2 | Status: SHIPPED | OUTPATIENT
Start: 2024-02-21

## 2024-02-21 RX ORDER — ACETAMINOPHEN AND CODEINE PHOSPHATE 300; 30 MG/1; MG/1
TABLET ORAL
Qty: 120 TABLET | Refills: 2 | Status: SHIPPED | OUTPATIENT
Start: 2024-02-21

## 2024-02-21 RX ORDER — ASCORBIC ACID 500 MG
TABLET ORAL
Qty: 180 TABLET | Refills: 1 | Status: SHIPPED | OUTPATIENT
Start: 2024-02-21

## 2024-02-21 RX ORDER — CYCLOBENZAPRINE HCL 10 MG
10 TABLET ORAL 3 TIMES DAILY PRN
Qty: 90 TABLET | Refills: 5 | Status: SHIPPED | OUTPATIENT
Start: 2024-02-21

## 2024-02-21 RX ORDER — OXYBUTYNIN CHLORIDE 10 MG/1
10 TABLET, EXTENDED RELEASE ORAL DAILY
Qty: 90 TABLET | Refills: 3 | Status: SHIPPED | OUTPATIENT
Start: 2024-02-21

## 2024-02-21 RX ORDER — EZETIMIBE 10 MG/1
10 TABLET ORAL DAILY
Qty: 90 TABLET | Refills: 3 | Status: SHIPPED | OUTPATIENT
Start: 2024-02-21

## 2024-02-21 RX ORDER — NAPROXEN 500 MG/1
500 TABLET ORAL 2 TIMES DAILY PRN
Qty: 180 TABLET | Refills: 1 | Status: SHIPPED | OUTPATIENT
Start: 2024-02-21

## 2024-02-21 RX ORDER — PANTOPRAZOLE SODIUM 40 MG/1
40 TABLET, DELAYED RELEASE ORAL DAILY
Qty: 90 TABLET | Refills: 3 | Status: SHIPPED | OUTPATIENT
Start: 2024-02-21

## 2024-02-26 NOTE — PROGRESS NOTES
LVM    -- Is he taking his cholesterol medicine (atorvastatin and ezetimibe)? Is he taking vitamin D?

## 2024-02-27 ENCOUNTER — TELEPHONE (OUTPATIENT)
Dept: FAMILY MEDICINE CLINIC | Facility: CLINIC | Age: 49
End: 2024-02-27
Payer: MEDICARE

## 2024-02-27 NOTE — TELEPHONE ENCOUNTER
----- Message from Harvey Carrington MD sent at 2/26/2024 10:26 PM EST -----  Thanks    ----- Message -----  From: Christy Denton MA  Sent: 2/26/2024  10:31 AM EST  To: Harvey Carrington MD    Insurance wasn't covered but they said they will try again.       ----- Message -----  From: Harvey Carrington MD  Sent: 2/20/2024  10:19 AM EST  To: Christy Denton MA    Please try to find out why he has not received pneumatic compression device from [facility]

## 2024-10-28 ENCOUNTER — TELEPHONE (OUTPATIENT)
Dept: FAMILY MEDICINE CLINIC | Facility: CLINIC | Age: 49
End: 2024-10-28
Payer: MEDICARE

## 2024-10-28 NOTE — TELEPHONE ENCOUNTER
"Caller: Dio Stephens \"CJ\"    Relationship to patient: Self    Best call back number: [phone]     Type of visit: SUBSEQUENT MEDICARE WELLNESS VISIT    Requested date: 11/1/24    If rescheduling, when is the original appointment: 11/19/2024    Additional notes: ONLY DAY HE HAS TRANSPORTATION TO THE OFFICE.  PLEASE CALL AND ADVISE       "

## 2024-10-28 NOTE — TELEPHONE ENCOUNTER
HUB TO RELAY    I was unable to reach CJ, left message with appt info for Friday November 1, 2024 at 2pm  Medicare well exam is not due until after November 17 th

## 2024-11-26 ENCOUNTER — OFFICE VISIT (OUTPATIENT)
Dept: FAMILY MEDICINE CLINIC | Facility: CLINIC | Age: 49
End: 2024-11-26
Payer: MEDICARE

## 2024-11-26 DIAGNOSIS — D50.0 IRON DEFICIENCY ANEMIA DUE TO CHRONIC BLOOD LOSS: ICD-10-CM

## 2024-11-26 DIAGNOSIS — E78.2 MIXED HYPERLIPIDEMIA: ICD-10-CM

## 2024-11-26 DIAGNOSIS — Z86.711 HISTORY OF PULMONARY EMBOLISM: ICD-10-CM

## 2024-11-26 DIAGNOSIS — K21.9 GASTROESOPHAGEAL REFLUX DISEASE WITHOUT ESOPHAGITIS: ICD-10-CM

## 2024-11-26 DIAGNOSIS — M25.561 CHRONIC PAIN OF BOTH KNEES: ICD-10-CM

## 2024-11-26 DIAGNOSIS — Z00.00 HEALTHCARE MAINTENANCE: ICD-10-CM

## 2024-11-26 DIAGNOSIS — E29.1 ANDROGEN DEFICIENCY: ICD-10-CM

## 2024-11-26 DIAGNOSIS — E11.42 TYPE 2 DIABETES MELLITUS WITH PERIPHERAL NEUROPATHY: ICD-10-CM

## 2024-11-26 DIAGNOSIS — E66.01 CLASS 3 SEVERE OBESITY WITH SERIOUS COMORBIDITY AND BODY MASS INDEX (BMI) GREATER THAN OR EQUAL TO 70 IN ADULT, UNSPECIFIED OBESITY TYPE: ICD-10-CM

## 2024-11-26 DIAGNOSIS — G56.03 BILATERAL CARPAL TUNNEL SYNDROME: ICD-10-CM

## 2024-11-26 DIAGNOSIS — E66.813 CLASS 3 SEVERE OBESITY WITH SERIOUS COMORBIDITY AND BODY MASS INDEX (BMI) GREATER THAN OR EQUAL TO 70 IN ADULT, UNSPECIFIED OBESITY TYPE: ICD-10-CM

## 2024-11-26 DIAGNOSIS — M25.562 CHRONIC PAIN OF BOTH KNEES: ICD-10-CM

## 2024-11-26 DIAGNOSIS — G89.29 CHRONIC PAIN OF BOTH KNEES: ICD-10-CM

## 2024-11-26 DIAGNOSIS — M54.59 MECHANICAL LOW BACK PAIN: ICD-10-CM

## 2024-11-26 DIAGNOSIS — N52.01 ERECTILE DYSFUNCTION DUE TO ARTERIAL INSUFFICIENCY: ICD-10-CM

## 2024-11-26 DIAGNOSIS — I83.93 VARICOSE VEINS OF BOTH LOWER EXTREMITIES, UNSPECIFIED WHETHER COMPLICATED: Primary | ICD-10-CM

## 2024-11-26 DIAGNOSIS — I87.2 CHRONIC VENOUS INSUFFICIENCY: ICD-10-CM

## 2024-11-26 DIAGNOSIS — D35.2 PITUITARY ADENOMA: ICD-10-CM

## 2024-11-26 DIAGNOSIS — K44.9 PARAESOPHAGEAL HIATAL HERNIA: ICD-10-CM

## 2024-11-26 DIAGNOSIS — I10 ESSENTIAL HYPERTENSION: ICD-10-CM

## 2024-11-26 DIAGNOSIS — E55.9 VITAMIN D DEFICIENCY: ICD-10-CM

## 2024-11-26 DIAGNOSIS — G47.33 OBSTRUCTIVE SLEEP APNEA: ICD-10-CM

## 2024-11-26 PROCEDURE — 82607 VITAMIN B-12: CPT | Performed by: GENERAL PRACTICE

## 2024-11-26 PROCEDURE — 3078F DIAST BP <80 MM HG: CPT | Performed by: GENERAL PRACTICE

## 2024-11-26 PROCEDURE — 1125F AMNT PAIN NOTED PAIN PRSNT: CPT | Performed by: GENERAL PRACTICE

## 2024-11-26 PROCEDURE — 80053 COMPREHEN METABOLIC PANEL: CPT | Performed by: GENERAL PRACTICE

## 2024-11-26 PROCEDURE — 84443 ASSAY THYROID STIM HORMONE: CPT | Performed by: GENERAL PRACTICE

## 2024-11-26 PROCEDURE — 83540 ASSAY OF IRON: CPT | Performed by: GENERAL PRACTICE

## 2024-11-26 PROCEDURE — 85025 COMPLETE CBC W/AUTO DIFF WBC: CPT | Performed by: GENERAL PRACTICE

## 2024-11-26 PROCEDURE — G0439 PPPS, SUBSEQ VISIT: HCPCS | Performed by: GENERAL PRACTICE

## 2024-11-26 PROCEDURE — 1170F FXNL STATUS ASSESSED: CPT | Performed by: GENERAL PRACTICE

## 2024-11-26 PROCEDURE — 90656 IIV3 VACC NO PRSV 0.5 ML IM: CPT | Performed by: GENERAL PRACTICE

## 2024-11-26 PROCEDURE — 83036 HEMOGLOBIN GLYCOSYLATED A1C: CPT | Performed by: GENERAL PRACTICE

## 2024-11-26 PROCEDURE — 3044F HG A1C LEVEL LT 7.0%: CPT | Performed by: GENERAL PRACTICE

## 2024-11-26 PROCEDURE — 3074F SYST BP LT 130 MM HG: CPT | Performed by: GENERAL PRACTICE

## 2024-11-26 PROCEDURE — 82043 UR ALBUMIN QUANTITATIVE: CPT | Performed by: GENERAL PRACTICE

## 2024-11-26 PROCEDURE — 82728 ASSAY OF FERRITIN: CPT | Performed by: GENERAL PRACTICE

## 2024-11-26 PROCEDURE — 84466 ASSAY OF TRANSFERRIN: CPT | Performed by: GENERAL PRACTICE

## 2024-11-26 PROCEDURE — G0008 ADMIN INFLUENZA VIRUS VAC: HCPCS | Performed by: GENERAL PRACTICE

## 2024-11-26 PROCEDURE — 82306 VITAMIN D 25 HYDROXY: CPT | Performed by: GENERAL PRACTICE

## 2024-11-26 PROCEDURE — 99214 OFFICE O/P EST MOD 30 MIN: CPT | Performed by: GENERAL PRACTICE

## 2024-11-26 PROCEDURE — 80061 LIPID PANEL: CPT | Performed by: GENERAL PRACTICE

## 2024-11-26 RX ORDER — SILDENAFIL 100 MG/1
TABLET, FILM COATED ORAL
Qty: 10 TABLET | Refills: 5 | Status: SHIPPED | OUTPATIENT
Start: 2024-11-26

## 2024-11-26 RX ORDER — PANTOPRAZOLE SODIUM 40 MG/1
40 TABLET, DELAYED RELEASE ORAL DAILY
Qty: 90 TABLET | Refills: 3 | Status: SHIPPED | OUTPATIENT
Start: 2024-11-26

## 2024-11-26 RX ORDER — NAPROXEN 500 MG/1
500 TABLET ORAL 2 TIMES DAILY PRN
Qty: 180 TABLET | Refills: 1 | Status: SHIPPED | OUTPATIENT
Start: 2024-11-26

## 2024-11-26 RX ORDER — EZETIMIBE 10 MG/1
10 TABLET ORAL DAILY
Qty: 90 TABLET | Refills: 3 | Status: SHIPPED | OUTPATIENT
Start: 2024-11-26

## 2024-11-26 RX ORDER — ATORVASTATIN CALCIUM 80 MG/1
80 TABLET, FILM COATED ORAL NIGHTLY
Qty: 90 TABLET | Refills: 3 | Status: SHIPPED | OUTPATIENT
Start: 2024-11-26

## 2024-11-26 RX ORDER — CYCLOBENZAPRINE HCL 10 MG
10 TABLET ORAL 3 TIMES DAILY PRN
Qty: 90 TABLET | Refills: 5 | Status: SHIPPED | OUTPATIENT
Start: 2024-11-26

## 2024-11-26 RX ORDER — GABAPENTIN 300 MG/1
300 CAPSULE ORAL 2 TIMES DAILY
Qty: 60 CAPSULE | Refills: 2 | Status: SHIPPED | OUTPATIENT
Start: 2024-11-26

## 2024-11-26 RX ORDER — ACETAMINOPHEN AND CODEINE PHOSPHATE 300; 30 MG/1; MG/1
TABLET ORAL
Qty: 120 TABLET | Refills: 2 | Status: SHIPPED | OUTPATIENT
Start: 2024-11-26

## 2024-11-26 NOTE — ASSESSMENT & PLAN NOTE
Reminded regarding lifestyle modification  Continue current medication  Orders:    CBC & Differential    pantoprazole (PROTONIX) 40 MG EC tablet; Take 1 tablet by mouth Daily.

## 2024-11-26 NOTE — ASSESSMENT & PLAN NOTE
Reminded regarding symptomatic treatment.   Continue current medication  Encouraged to report if any worse or if any new symptoms or concerns.  Orders:    naproxen (NAPROSYN) 500 MG tablet; Take 1 tablet by mouth 2 (Two) Times a Day As Needed for Mild Pain or Moderate Pain.    gabapentin (NEURONTIN) 300 MG capsule; Take 1 capsule by mouth 2 (Two) Times a Day.    cyclobenzaprine (FLEXERIL) 10 MG tablet; Take 1 tablet by mouth 3 (Three) Times a Day As Needed for Muscle Spasms.    acetaminophen-codeine (TYLENOL with CODEINE #3) 300-30 MG per tablet; 1 po bid and 1-2 po qhs

## 2024-11-26 NOTE — ASSESSMENT & PLAN NOTE
As above.  Orders:    naproxen (NAPROSYN) 500 MG tablet; Take 1 tablet by mouth 2 (Two) Times a Day As Needed for Mild Pain or Moderate Pain.    gabapentin (NEURONTIN) 300 MG capsule; Take 1 capsule by mouth 2 (Two) Times a Day.    acetaminophen-codeine (TYLENOL with CODEINE #3) 300-30 MG per tablet; 1 po bid and 1-2 po qhs

## 2024-11-26 NOTE — ASSESSMENT & PLAN NOTE
Encouraged to continue to work on his diet and exercise plan.  Atorvastatin will be resumed  Orders:    Comprehensive Metabolic Panel    Lipid Panel    TSH    ezetimibe (ZETIA) 10 MG tablet; Take 1 tablet by mouth Daily.    atorvastatin (LIPITOR) 80 MG tablet; Take 1 tablet by mouth Every Night.

## 2024-11-26 NOTE — ASSESSMENT & PLAN NOTE
BP remains at goal off medication  We will continue to monitor blood pressure and urine microalbumin levels  Orders:    CBC & Differential    Comprehensive Metabolic Panel    TSH

## 2024-11-26 NOTE — PROGRESS NOTES
Subjective   The ABCs of the Annual Wellness Visit  Medicare Wellness Visit    Dio Stephens is a 49 y.o. patient who presents for a Medicare Wellness Visit.    The following portions of the patient's history were reviewed and   updated as appropriate: allergies, current medications, past family history, past medical history, past social history, past surgical history, and problem list.    Compared to one year ago, the patient's physical   health is worse.  Compared to one year ago, the patient's mental   health is worse.    Recent Hospitalizations:  He was not admitted to the hospital during the last year.     Current Medical Providers:  Patient Care Team:  Harvey Carrington MD as PCP - General (Family Medicine)    Outpatient Medications Prior to Visit   Medication Sig Dispense Refill    Ascorbic Acid (vitamin C with kimo hips tablet) 500 MG tablet 1 po with each dose of ferrous sulfate 180 tablet 1    ferrous sulfate 325 (65 FE) MG tablet Take 1 tablet by mouth 2 (Two) Times a Day. 180 tablet 1    oxybutynin XL (DITROPAN-XL) 10 MG 24 hr tablet Take 1 tablet by mouth Daily. 90 tablet 3    vitamin D (ERGOCALCIFEROL) 1.25 MG (13854 UT) capsule capsule Take 1 capsule by mouth 1 (One) Time Per Week. 12 capsule 1    acetaminophen-codeine (TYLENOL with CODEINE #3) 300-30 MG per tablet 1 po bid and 1-2 po qhs 120 tablet 2    atorvastatin (LIPITOR) 80 MG tablet Take 1 tablet by mouth Every Night. 90 tablet 3    cyclobenzaprine (FLEXERIL) 10 MG tablet Take 1 tablet by mouth 3 (Three) Times a Day As Needed for Muscle Spasms. 90 tablet 5    ezetimibe (ZETIA) 10 MG tablet Take 1 tablet by mouth Daily. 90 tablet 3    gabapentin (NEURONTIN) 300 MG capsule Take 1 capsule by mouth 2 (Two) Times a Day. 60 capsule 2    metFORMIN (GLUCOPHAGE) 500 MG tablet Take 1 tablet by mouth 2 (Two) Times a Day With Meals. 180 tablet 3    naproxen (NAPROSYN) 500 MG tablet Take 1 tablet by mouth 2 (Two) Times a Day As Needed for Mild  Pain or Moderate Pain. 180 tablet 1    pantoprazole (PROTONIX) 40 MG EC tablet Take 1 tablet by mouth Daily. 90 tablet 3    sildenafil (VIAGRA) 100 MG tablet 1/4-1 po qd prn 10 tablet 5    rivaroxaban (Xarelto) 10 MG tablet Take 2 tablets by mouth Daily. (Patient not taking: Reported on 11/26/2024) 140 tablet 0     No facility-administered medications prior to visit.     Opioid medication/s are on active medication list.  and I have evaluated his active treatment plan and pain score trends (see table).  Vitals:    11/26/24 1321   PainSc:   5     I have reviewed the chart for potential of high risk medication and harmful drug interactions in the elderly.        Aspirin is not on active medication list.  Aspirin use is not indicated based on review of current medical condition/s. Risk of harm outweighs potential benefits.  .    Patient Active Problem List   Diagnosis    Bilateral knee pain    Vasculogenic erectile dysfunction    Gastroesophageal reflux disease without esophagitis    Mixed hyperlipidemia    Essential hypertension    Androgen deficiency    Iron deficiency anemia due to chronic blood loss    Mechanical low back pain    Pituitary adenoma    History of pulmonary embolism    Obstructive sleep apnea    Type 2 diabetes mellitus with peripheral neuropathy    Varicose veins of legs    Chronic venous insufficiency    Encounter for immunization    Healthcare maintenance    Paraesophageal hiatal hernia    Bilateral carpal tunnel syndrome    Concealed penis    Vitamin D deficiency    Class 3 severe obesity with serious comorbidity and body mass index (BMI) greater than or equal to 70 in adult    Venous stasis ulcer of left thigh limited to breakdown of skin with varicose veins     Advance Care Planning Advance Directive is not on file.  ACP discussion was held with the patient during this visit. Patient does not have an advance directive, information provided.      Objective   Vitals:    11/26/24 1321   BP: 126/62  "  Pulse: 75   Resp: 16   Temp: 97.2 °F (36.2 °C)   TempSrc: Temporal   SpO2: 93%   Weight: (!) 200 kg (440 lb)   Height: 167.6 cm (65.98\")   PainSc:   5     Estimated body mass index is 71.05 kg/m² as calculated from the following:    Height as of this encounter: 167.6 cm (65.98\").    Weight as of this encounter: 200 kg (440 lb).    Class 3 Severe Obesity (BMI >=40). Obesity-related health conditions include the following: obstructive sleep apnea, hypertension, diabetes mellitus, dyslipidemias, GERD, osteoarthritis, and lower extremity venous stasis disease. Obesity is unchanged. BMI is is above average; BMI management plan is completed. We discussed portion control and increasing exercise.    Does the patient have evidence of cognitive impairment? No                                                                                          Health  Risk Assessment    Smoking Status:  Social History     Tobacco Use   Smoking Status Never    Passive exposure: Never   Smokeless Tobacco Never     Alcohol Consumption:  Social History     Substance and Sexual Activity   Alcohol Use No     Fall Risk Screen  STEADI Fall Risk Assessment was completed, and patient is at LOW risk for falls.Assessment completed on:2024    Depression Screening   Little interest or pleasure in doing things? Not at all   Feeling down, depressed, or hopeless? Not at all   PHQ-2 Total Score 0      Health Habits and Functional and Cognitive Screenin/26/2024     1:21 PM   Functional & Cognitive Status   Do you have difficulty preparing food and eating? No   Do you have difficulty bathing yourself, getting dressed or grooming yourself? No   Do you have difficulty using the toilet? No   Do you have difficulty moving around from place to place? Yes   Do you have trouble with steps or getting out of a bed or a chair? Yes   Current Diet Unhealthy Diet   Dental Exam Not up to date   Eye Exam Not up to date   Exercise (times per week) 0 times " per week   Current Exercises Include No Regular Exercise   Do you need help using the phone?  No   Are you deaf or do you have serious difficulty hearing?  No   Do you need help to go to places out of walking distance? Yes   Do you need help shopping? No   Do you need help preparing meals?  No   Do you need help with housework?  No   Do you need help with laundry? No   Do you need help taking your medications? No   Do you need help managing money? No   Do you ever drive or ride in a car without wearing a seat belt? No   Have you felt unusual stress, anger or loneliness in the last month? No   Who do you live with? Spouse   If you need help, do you have trouble finding someone available to you? No   Have you been bothered in the last four weeks by sexual problems? No   Do you have difficulty concentrating, remembering or making decisions? No           Age-appropriate Screening Schedule:  Refer to the list below for future screening recommendations based on patient's age, sex and/or medical conditions. Orders for these recommended tests are listed in the plan section. The patient has been provided with a written plan.    Health Maintenance List  Health Maintenance   Topic Date Due    COLORECTAL CANCER SCREENING  Never done    DIABETIC EYE EXAM  Never done    Hepatitis B (1 of 3 - 19+ 3-dose series) Never done    ZOSTER VACCINE (1 of 2) Never done    COVID-19 Vaccine (4 - 2024-25 season) 09/01/2024    HEMOGLOBIN A1C  05/26/2025    ANNUAL WELLNESS VISIT  11/26/2025    LIPID PANEL  11/26/2025    URINE MICROALBUMIN  11/26/2025    BMI FOLLOWUP  11/26/2025    TDAP/TD VACCINES (2 - Td or Tdap) 09/15/2030    HEPATITIS C SCREENING  Completed    Pneumococcal Vaccine 0-64  Completed    INFLUENZA VACCINE  Completed                                                                                                                                              CMS Preventative Services Quick Reference  Risk Factors Identified During  Encounter  Chronic Pain: Natural history and expected course discussed. Questions answered.  Fall Risk-High or Moderate: Discussed Fall Prevention in the home  Immunizations Discussed/Encouraged: Influenza and COVID19    The above risks/problems have been discussed with the patient.  Pertinent information has been shared with the patient in the After Visit Summary.  An After Visit Summary and PPPS were made available to the patient.    Follow Up:   Next Medicare Wellness visit to be scheduled in 1 year.     Additional E&M Note during same encounter follows:  Patient has additional, significant, and separately identifiable condition(s)/problem(s) that require work above and beyond the Medicare Wellness Visit     Chief Complaint  He returns for a scheduled reassessment of multiple medical problems including chronic venous insufficiency, previous DVT/PE, recurrent iron deficiency anemia, pituitary adenoma, class III obesity, type 2 diabetes mellitus, hyperlipidemia, joint pain, marital disruption    Subjective   HPI  CJ is also being seen today for additional medical problem/s.    Marital Disruption  He and his wife  earlier this year. This has been collegial but nevertheless, stressful.  He is now living in [place], and admits that he is struggling financially.  He has a supportive family and denies any persistent depression, loss of interest in activities, or suicidal ideation.    Lower Extremity Edema  He complains of persistent bilateral lower extremity swelling, but denies any recent drainage.  He continues to deny any new calf pain or any increase in his shortness of breath.  He continues to deny any cough or hemoptysis and there is no history of any fever or chills.  He ran out of all of his medications since last here    Recurrent DVT/PE  S/P IVC filter. He continues to deny any change in his shortness of breath, and has had no calf pain, chest pain or hemoptysis. He is quite aware of the dangers of of  another PE without anticoagulation    Iron Deficiency Anemia  He has required transfusion of packed red blood cells in the past. He continues to deny any hematochezia, melena, or hematuria.      Pituitary Adenoma  This was discovered incidentally when he was worked up for thrombosed orbital varices. It was felt to be nonsecreting and was stable on several follow-up studies, but he has not undergone neurosurgical reassessment for a long time.  He continues to deny any new headaches and has had no visual disturbances.      Class III Obesity  He has a BMI over 72 complicated by type 2 diabetes mellitus, hyperlipidemia, chronic back and joint pain, gastroesophageal reflux disease, chronic venous insufficiency, recurrent DVT with previous PE, and HEMALATHA.  He is following an appropriate diet but his activities remain quite limited due to his back and joint pain.  He is a high risk candidate for bariatric surgery, and cannot afford a GLP agonist at present    Type 2 Diabetes Mellitus  He admits to paresthesias of the feet.  He continues to deny any visual disturbances, polydipsia, polyuria, hypoglycemia or foot ulcerations.  He does not check his glucose.  He did quite well with semaglutide but is unable to afford it.  He has not had any recent labs.  His last dilated eye exam was more then one year ago.       Hyperlipidemia  His compliance with treatment has been fair.  He has been trying to follow his diet and get what exercise his back and joint pain permits.     Urinary Frequency  He underwent a urology assessment with Dr. Gonzalez and was started on oxybutynin.  He has been off of it as well and really cannot tell any recent change in his urinary tract habits    Joint Pain  He has chronic low back, left shoulder, and bilateral knee pain.  He feels this has worsened with time, but continues to deny any change in the quality or any new associated symptoms.      Carpal Tunnel Syndrome  He continues to experience intermittent  "paresthesias of both hands extending proximally to the mid forearms.  This remains present more than not and has been associated with some reduction in his  strength.  There is no history of any numbness and he continues to deny any new neck pain, nor any change in his lower extremity strength or bowel/bladder control.  With the exception of activity he has been unable to identify any exacerbating factors. Shaking his hands seems to help and he has been wearing splints at night with some improvement.  NCS/EMG performed on 10/9/2019 confirmed changes consistent with a bilateral median neuropathy worse on the right.  He remains uninterested in pursuing a surgical assessment at present  Review of Systems   Constitutional:  Positive for fatigue. Negative for appetite change, chills and fever.   HENT:  Negative for congestion, ear pain, postnasal drip, rhinorrhea, sneezing, sore throat and voice change.    Eyes:  Negative for visual disturbance.   Respiratory:  Positive for cough and shortness of breath. Negative for wheezing.    Cardiovascular:  Positive for leg swelling. Negative for chest pain and palpitations.   Gastrointestinal:  Negative for abdominal pain, blood in stool, constipation, diarrhea, nausea and vomiting.   Genitourinary:  Positive for frequency. Negative for dysuria, hematuria and urgency.   Musculoskeletal:  Positive for arthralgias and back pain. Negative for joint swelling, myalgias and neck pain.   Skin:  Negative for rash.   Neurological:  Positive for weakness (hands) and numbness (feet with paresthesias of the hands).   Psychiatric/Behavioral:  Positive for decreased concentration and dysphoric mood (frustrated regarding his current finances). Negative for sleep disturbance and suicidal ideas. The patient is nervous/anxious.       Objective   Vital Signs:  /62   Pulse 75   Temp 97.2 °F (36.2 °C) (Temporal)   Resp 16   Ht 167.6 cm (65.98\")   Wt (!) 200 kg (440 lb)   SpO2 93%   " BMI 71.05 kg/m²     Physical Exam  Constitutional:       General: He is not in acute distress.     Appearance: Normal appearance. He is well-developed. He is not ill-appearing or diaphoretic.      Comments: Bright and in fair spirits.  Antalgic gait.  Able to climb onto the exam table with minimal one-person assistance.  No apparent distress at rest.  No pallor, jaundice, diaphoresis, or cyanosis.   HENT:      Head: Atraumatic.      Right Ear: Tympanic membrane, ear canal and external ear normal.      Left Ear: Tympanic membrane, ear canal and external ear normal.      Mouth/Throat:      Lips: No lesions.      Mouth: Mucous membranes are moist. No oral lesions.      Pharynx: No oropharyngeal exudate or posterior oropharyngeal erythema.   Eyes:      General: Lids are normal. Gaze aligned appropriately.      Extraocular Movements: Extraocular movements intact.      Conjunctiva/sclera: Conjunctivae normal.      Pupils: Pupils are equal.   Neck:      Thyroid: No thyroid mass or thyromegaly.      Vascular: No carotid bruit or JVD.      Trachea: Trachea normal. No tracheal deviation.   Cardiovascular:      Rate and Rhythm: Normal rate and regular rhythm.      Heart sounds: Normal heart sounds, S1 normal and S2 normal. No murmur heard.     No gallop. No S3 or S4 sounds.   Pulmonary:      Effort: Pulmonary effort is normal.      Breath sounds: Normal breath sounds.   Abdominal:      General: Abdomen is protuberant. Bowel sounds are normal. There is no distension or abdominal bruit.      Palpations: Abdomen is soft. There is no hepatomegaly, splenomegaly or mass.      Tenderness: There is no abdominal tenderness.      Hernia: No hernia is present.   Musculoskeletal:      Right lower le+ Pitting Edema (with stasis changes) present.      Left lower le+ Pitting Edema (with stasis changes) present.   Lymphadenopathy:      Head:      Right side of head: No submental, submandibular, tonsillar, preauricular, posterior  auricular or occipital adenopathy.      Left side of head: No submental, submandibular, tonsillar, preauricular, posterior auricular or occipital adenopathy.      Cervical: No cervical adenopathy.      Upper Body:      Right upper body: No supraclavicular adenopathy.      Left upper body: No supraclavicular adenopathy.   Skin:     General: Skin is warm and dry.      Coloration: Skin is not cyanotic, jaundiced or pale.      Findings: No rash.      Nails: There is no clubbing.   Neurological:      Mental Status: He is alert and oriented to person, place, and time.      Cranial Nerves: No cranial nerve deficit, dysarthria or facial asymmetry.      Sensory: Sensory deficit (decreased vibration sense toes of both feet) present.      Motor: No tremor.      Coordination: Coordination normal.      Gait: Gait normal.   Psychiatric:         Attention and Perception: Attention normal.         Mood and Affect: Mood normal.         Speech: Speech normal.         Behavior: Behavior normal.         Thought Content: Thought content normal.           Assessment and Plan      Varicose veins of both lower extremities, unspecified whether complicated  Reminded regarding lifestyle modification  Mixed hyperlipidemia   Encouraged to continue to work on his diet and exercise plan.  Atorvastatin will be resumed  Orders:    Comprehensive Metabolic Panel    Lipid Panel    TSH    ezetimibe (ZETIA) 10 MG tablet; Take 1 tablet by mouth Daily.    atorvastatin (LIPITOR) 80 MG tablet; Take 1 tablet by mouth Every Night.    Essential hypertension  BP remains at goal off medication  We will continue to monitor blood pressure and urine microalbumin levels  Orders:    CBC & Differential    Comprehensive Metabolic Panel    TSH    Chronic venous insufficiency  As above.  History of pulmonary embolism  Reminded of the importance of continued anticoagulation  Provided with samples of rivaroxaban  Orders:    CBC & Differential    rivaroxaban (XARELTO) 15 MG  tablet; Take 1 tablet by mouth Daily.    Rivaroxaban (XARELTO) 2.5 MG tablet; Take 2 tablets by mouth Daily.    Vitamin D deficiency    Orders:    Vitamin D,25-Hydroxy    Type 2 diabetes mellitus with peripheral neuropathy  Diabetes mellitus Type II, under unknown control.   Encouraged to continue to pursue ADA diet  Encouraged aerobic exercise.  Metformin will be resumed  Will start on a GLP agonist if circumstances should permit  Updated labs drawn  Orders:    Comprehensive Metabolic Panel    TSH    Hemoglobin A1c    Vitamin B12    MicroAlbumin, Urine, Random - Urine, Clean Catch    metFORMIN (GLUCOPHAGE) 500 MG tablet; Take 1 tablet by mouth 2 (Two) Times a Day With Meals.    Class 3 severe obesity with serious comorbidity and body mass index (BMI) greater than or equal to 70 in adult, unspecified obesity type  As above.     Androgen deficiency    Paraesophageal hiatal hernia  Orders:    pantoprazole (PROTONIX) 40 MG EC tablet; Take 1 tablet by mouth Daily.    Gastroesophageal reflux disease without esophagitis  Reminded regarding lifestyle modification  Continue current medication  Orders:    CBC & Differential    pantoprazole (PROTONIX) 40 MG EC tablet; Take 1 tablet by mouth Daily.    Erectile dysfunction due to arterial insufficiency  Orders:    sildenafil (VIAGRA) 100 MG tablet; 1/4-1 po qd prn    Healthcare maintenance  Flu shot administered.  Recommended an updated COVID-19 shot along with Shingrix  Reminded of the potential benefits of colon cancer screening  Orders:    Fluzone >6mos    Pituitary adenoma    Iron deficiency anemia due to chronic blood loss  Will continue to monitor  Orders:    CBC & Differential    Iron    Transferrin    Ferritin    Mechanical low back pain  Reminded regarding symptomatic treatment.   Continue current medication  Encouraged to report if any worse or if any new symptoms or concerns.  Orders:    naproxen (NAPROSYN) 500 MG tablet; Take 1 tablet by mouth 2 (Two) Times a Day As  Needed for Mild Pain or Moderate Pain.    gabapentin (NEURONTIN) 300 MG capsule; Take 1 capsule by mouth 2 (Two) Times a Day.    cyclobenzaprine (FLEXERIL) 10 MG tablet; Take 1 tablet by mouth 3 (Three) Times a Day As Needed for Muscle Spasms.    acetaminophen-codeine (TYLENOL with CODEINE #3) 300-30 MG per tablet; 1 po bid and 1-2 po qhs    Chronic pain of both knees  As above.  Orders:    naproxen (NAPROSYN) 500 MG tablet; Take 1 tablet by mouth 2 (Two) Times a Day As Needed for Mild Pain or Moderate Pain.    gabapentin (NEURONTIN) 300 MG capsule; Take 1 capsule by mouth 2 (Two) Times a Day.    acetaminophen-codeine (TYLENOL with CODEINE #3) 300-30 MG per tablet; 1 po bid and 1-2 po qhs    Bilateral carpal tunnel syndrome    Obstructive sleep apnea  Orders:    CBC & Differential            Follow Up   Return in about 3 months (around 3/7/2025).  Patient was given instructions and counseling regarding his condition or for health maintenance advice. Please see specific information pulled into the AVS if appropriate.

## 2024-11-26 NOTE — PATIENT INSTRUCTIONS
Fall Prevention in the Home, Adult  Falls can cause injuries and can happen to people of all ages. There are many things you can do to make your home safer and to help prevent falls.  What actions can I take to prevent falls?  General information  Use good lighting in all rooms. Make sure to:  Replace any light bulbs that burn out.  Turn on the lights in dark areas and use night-lights.  Keep items that you use often in easy-to-reach places. Lower the shelves around your home if needed.  Move furniture so that there are clear paths around it.  Do not use throw rugs or other things on the floor that can make you trip.  If any of your floors are uneven, fix them.  Add color or contrast paint or tape to clearly kvng and help you see:  Grab bars or handrails.  First and last steps of staircases.  Where the edge of each step is.  If you use a ladder or stepladder:  Make sure that it is fully opened. Do not climb a closed ladder.  Make sure the sides of the ladder are locked in place.  Have someone hold the ladder while you use it.  Know where your pets are as you move through your home.  What can I do in the bathroom?         Keep the floor dry. Clean up any water on the floor right away.  Remove soap buildup in the bathtub or shower. Buildup makes bathtubs and showers slippery.  Use non-skid mats or decals on the floor of the bathtub or shower.  Attach bath mats securely with double-sided, non-slip rug tape.  If you need to sit down in the shower, use a non-slip stool.  Install grab bars by the toilet and in the bathtub and shower. Do not use towel bars as grab bars.  What can I do in the bedroom?  Make sure that you have a light by your bed that is easy to reach.  Do not use any sheets or blankets on your bed that hang to the floor.  Have a firm chair or bench with side arms that you can use for support when you get dressed.  What can I do in the kitchen?  Clean up any spills right away.  If you need to reach something  above you, use a step stool with a grab bar.  Keep electrical cords out of the way.  Do not use floor polish or wax that makes floors slippery.  What can I do with my stairs?  Do not leave anything on the stairs.  Make sure that you have a light switch at the top and the bottom of the stairs.  Make sure that there are handrails on both sides of the stairs. Fix handrails that are broken or loose.  Install non-slip stair treads on all your stairs if they do not have carpet.  Avoid having throw rugs at the top or bottom of the stairs.  Choose a carpet that does not hide the edge of the steps on the stairs. Make sure that the carpet is firmly attached to the stairs. Fix carpet that is loose or worn.  What can I do on the outside of my home?  Use bright outdoor lighting.  Fix the edges of walkways and driveways and fix any cracks. Clear paths of anything that can make you trip, such as tools or rocks.  Add color or contrast paint or tape to clearly kvng and help you see anything that might make you trip as you walk through a door, such as a raised step or threshold.  Trim any bushes or trees on paths to your home.  Check to see if handrails are loose or broken and that both sides of all steps have handrails. Install guardrails along the edges of any raised decks and porches.  Have leaves, snow, or ice cleared regularly. Use sand, salt, or ice melter on paths if you live where there is ice and snow during the winter.  Clean up any spills in your garage right away. This includes grease or oil spills.  What other actions can I take?  Review your medicines with your doctor. Some medicines can cause dizziness or changes in blood pressure, which increase your risk of falling.  Wear shoes that:  Have a low heel. Do not wear high heels.  Have rubber bottoms and are closed at the toe.  Feel good on your feet and fit well.  Use tools that help you move around if needed. These include:  Canes.  Walkers.  Scooters.  Crutches.  Ask  your doctor what else you can do to help prevent falls. This may include seeing a physical therapist to learn to do exercises to move better and get stronger.  Where to find more information  Centers for Disease Control and PreventionNASH: cdc.gov  [employer] [place] [employer] [place]: puma.nih.gov  [place]: puma.nih.gov  Contact a doctor if:  You are afraid of falling at home.  You feel weak, drowsy, or dizzy at home.  You fall at home.  Get help right away if you:  Lose consciousness or have trouble moving after a fall.  Have a fall that causes a head injury.  These symptoms may be an emergency. Get help right away. Call 911.  Do not wait to see if the symptoms will go away.  Do not drive yourself to the hospital.  This information is not intended to replace advice given to you by your health care provider. Make sure you discuss any questions you have with your health care provider.  Document Revised: 08/21/2023 Document Reviewed: 08/21/2023  [employer] Patient Education © 2024 [employer]  Colorectal Cancer Screening    Colorectal cancer screening is a group of tests that are used to check for colorectal cancer before symptoms develop. Colorectal refers to the colon and rectum. The colon and rectum are located at the end of the digestive tract and carry stool (feces) out of the body.  Who should have screening?  All adults who are 45-75 years old should have screening. Your health care provider may recommend screening before age 45. You will have tests every 1-10 years, depending on your results and the type of screening test. Screening recommendations for adults who are 76-85 years old vary depending on a person's health. People older than age 85 should no longer get colorectal cancer screening.  You may have screening tests starting before age 45, or more often than other people, if you have any of these risk factors:  A personal or family history of colorectal cancer or abnormal growths known as  polyps in your colon.  Inflammatory bowel disease, such as ulcerative colitis or Crohn's disease.  A history of having radiation treatment to the abdomen or the area between the hip bones (pelvic area) for cancer.  A type of genetic syndrome that is passed from parent to child (hereditary), such as:  Robles syndrome.  Familial adenomatous polyposis.  Turcot syndrome.  Peutz-Jeghers syndrome.  MUTYH-associated polyposis (MAP).  A personal history of diabetes.  Types of tests  There are several types of colorectal screening tests. You may have one or more of the following:  Guaiac-based fecal occult blood testing. For this test, a stool sample is checked for hidden (occult) blood, which could be a sign of colorectal cancer.  Fecal immunochemical test (FIT). For this test, a stool sample is checked for blood, which could be a sign of colorectal cancer.  Stool DNA test. For this test, a stool sample is checked for blood and changes in DNA that could lead to colorectal cancer.  Sigmoidoscopy. During this test, a thin, flexible tube with a camera on the end, called a sigmoidoscope, is used to examine the rectum and the lower colon.  Colonoscopy. During this test, a long, flexible tube with a camera on the end, called a colonoscope, is used to examine the entire colon and rectum. Also, sometimes a tissue sample is taken to be looked at under a microscope (biopsy) or small polyps are removed during this test.  Virtual colonoscopy. Instead of a colonoscope, this type of colonoscopy uses a CT scan to take pictures of the colon and rectum. A CT scan is a type of X-ray that is made using computers.  What are the benefits of screening?  Screening reduces your risk for colorectal cancer and can help identify cancer at an early stage, when the cancer can be removed or treated more easily. It is common for polyps to form in the lining of the colon, especially as you age. These polyps may be cancerous or become cancerous over time.  Screening can identify these polyps.  What are the risks of screening?  Generally, these are safe tests. However, problems may occur, including:  The need for more tests to confirm results from a stool sample test. Stool sample tests have fewer risks than other types of screening tests.  Being exposed to low levels of radiation, if you had a test involving X-rays. This may slightly increase your cancer risk. The benefit of detecting cancer outweighs the slight increase in risk.  Bleeding, damage to the intestine, or infection caused by a sigmoidoscopy or colonoscopy.  A reaction to medicines given during a sigmoidoscopy or colonoscopy.  Talk with your health care provider to understand your risk for colorectal cancer and to make a screening plan that is right for you.  Questions to ask your health care provider  When should I start colorectal cancer screening?  What is my risk for colorectal cancer?  How often do I need screening?  Which screening tests do I need?  How do I get my test results?  What do my results mean?  Where to find more information  Learn more about colorectal cancer screening from:  The American Cancer Society: cancer.org  National Cancer [place]: cancer.gov  Summary  Colorectal cancer screening is a group of tests used to check for colorectal cancer before symptoms develop.  All adults who are 45-75 years old should have screening. Your health care provider may recommend screening before age 45.  You may have screening tests starting before age 45, or more often than other people, if you have certain risk factors.  Screening reduces your risk for colorectal cancer and can help identify cancer at an early stage, when the cancer can be removed or treated more easily.  Talk with your health care provider to understand your risk for colorectal cancer and to make a screening plan that is right for you.  This information is not intended to replace advice given to you by your health care provider.  Make sure you discuss any questions you have with your health care provider.  Document Revised: 04/07/2021 Document Reviewed: 04/07/2021  [employer] Patient Education © 2024 [employer]

## 2024-11-26 NOTE — ASSESSMENT & PLAN NOTE
Reminded of the importance of continued anticoagulation  Provided with samples of rivaroxaban  Orders:    CBC & Differential    rivaroxaban (XARELTO) 15 MG tablet; Take 1 tablet by mouth Daily.    Rivaroxaban (XARELTO) 2.5 MG tablet; Take 2 tablets by mouth Daily.

## 2024-11-26 NOTE — ASSESSMENT & PLAN NOTE
Diabetes mellitus Type II, under unknown control.   Encouraged to continue to pursue ADA diet  Encouraged aerobic exercise.  Metformin will be resumed  Will start on a GLP agonist if circumstances should permit  Updated labs drawn  Orders:    Comprehensive Metabolic Panel    TSH    Hemoglobin A1c    Vitamin B12    MicroAlbumin, Urine, Random - Urine, Clean Catch    metFORMIN (GLUCOPHAGE) 500 MG tablet; Take 1 tablet by mouth 2 (Two) Times a Day With Meals.

## 2024-11-26 NOTE — ASSESSMENT & PLAN NOTE
Flu shot administered.  Recommended an updated COVID-19 shot along with Shingrix  Reminded of the potential benefits of colon cancer screening  Orders:    Fluzone >6mos

## 2024-11-27 VITALS
WEIGHT: 315 LBS | RESPIRATION RATE: 16 BRPM | SYSTOLIC BLOOD PRESSURE: 126 MMHG | TEMPERATURE: 97.2 F | OXYGEN SATURATION: 93 % | BODY MASS INDEX: 50.62 KG/M2 | DIASTOLIC BLOOD PRESSURE: 62 MMHG | HEART RATE: 75 BPM | HEIGHT: 66 IN

## 2024-11-27 LAB
25(OH)D3 SERPL-MCNC: 15.1 NG/ML (ref 30–100)
ALBUMIN SERPL-MCNC: 3.6 G/DL (ref 3.5–5.2)
ALBUMIN UR-MCNC: 26.2 MG/DL
ALBUMIN/GLOB SERPL: 1 G/DL
ALP SERPL-CCNC: 71 U/L (ref 39–117)
ALT SERPL W P-5'-P-CCNC: 25 U/L (ref 1–41)
ANION GAP SERPL CALCULATED.3IONS-SCNC: 7 MMOL/L (ref 5–15)
AST SERPL-CCNC: 19 U/L (ref 1–40)
BASOPHILS # BLD AUTO: 0.05 10*3/MM3 (ref 0–0.2)
BASOPHILS NFR BLD AUTO: 0.8 % (ref 0–1.5)
BILIRUB SERPL-MCNC: 0.5 MG/DL (ref 0–1.2)
BUN SERPL-MCNC: 8 MG/DL (ref 6–20)
BUN/CREAT SERPL: 12.1 (ref 7–25)
CALCIUM SPEC-SCNC: 9.1 MG/DL (ref 8.6–10.5)
CHLORIDE SERPL-SCNC: 100 MMOL/L (ref 98–107)
CHOLEST SERPL-MCNC: 215 MG/DL (ref 0–200)
CO2 SERPL-SCNC: 30 MMOL/L (ref 22–29)
CREAT SERPL-MCNC: 0.66 MG/DL (ref 0.76–1.27)
DEPRECATED RDW RBC AUTO: 42.4 FL (ref 37–54)
EGFRCR SERPLBLD CKD-EPI 2021: 113.6 ML/MIN/1.73
EOSINOPHIL # BLD AUTO: 0.18 10*3/MM3 (ref 0–0.4)
EOSINOPHIL NFR BLD AUTO: 2.9 % (ref 0.3–6.2)
ERYTHROCYTE [DISTWIDTH] IN BLOOD BY AUTOMATED COUNT: 13.4 % (ref 12.3–15.4)
FERRITIN SERPL-MCNC: 38.1 NG/ML (ref 30–400)
GLOBULIN UR ELPH-MCNC: 3.6 GM/DL
GLUCOSE SERPL-MCNC: 90 MG/DL (ref 65–99)
HBA1C MFR BLD: 6.3 % (ref 4.8–5.6)
HCT VFR BLD AUTO: 45.8 % (ref 37.5–51)
HDLC SERPL-MCNC: 36 MG/DL (ref 40–60)
HGB BLD-MCNC: 15.1 G/DL (ref 13–17.7)
IMM GRANULOCYTES # BLD AUTO: 0.01 10*3/MM3 (ref 0–0.05)
IMM GRANULOCYTES NFR BLD AUTO: 0.2 % (ref 0–0.5)
IRON 24H UR-MRATE: 62 MCG/DL (ref 59–158)
LDLC SERPL CALC-MCNC: 152 MG/DL (ref 0–100)
LDLC/HDLC SERPL: 4.14 {RATIO}
LYMPHOCYTES # BLD AUTO: 1.4 10*3/MM3 (ref 0.7–3.1)
LYMPHOCYTES NFR BLD AUTO: 22.3 % (ref 19.6–45.3)
MCH RBC QN AUTO: 28.8 PG (ref 26.6–33)
MCHC RBC AUTO-ENTMCNC: 33 G/DL (ref 31.5–35.7)
MCV RBC AUTO: 87.2 FL (ref 79–97)
MONOCYTES # BLD AUTO: 0.57 10*3/MM3 (ref 0.1–0.9)
MONOCYTES NFR BLD AUTO: 9.1 % (ref 5–12)
NEUTROPHILS NFR BLD AUTO: 4.08 10*3/MM3 (ref 1.7–7)
NEUTROPHILS NFR BLD AUTO: 64.7 % (ref 42.7–76)
NRBC BLD AUTO-RTO: 0 /100 WBC (ref 0–0.2)
PLATELET # BLD AUTO: 242 10*3/MM3 (ref 140–450)
PMV BLD AUTO: 11 FL (ref 6–12)
POTASSIUM SERPL-SCNC: 4.3 MMOL/L (ref 3.5–5.2)
PROT SERPL-MCNC: 7.2 G/DL (ref 6–8.5)
RBC # BLD AUTO: 5.25 10*6/MM3 (ref 4.14–5.8)
SODIUM SERPL-SCNC: 137 MMOL/L (ref 136–145)
TRANSFERRIN SERPL-MCNC: 290 MG/DL (ref 200–360)
TRIGL SERPL-MCNC: 150 MG/DL (ref 0–150)
TSH SERPL DL<=0.05 MIU/L-ACNC: 1.97 UIU/ML (ref 0.27–4.2)
VIT B12 BLD-MCNC: 375 PG/ML (ref 211–946)
VLDLC SERPL-MCNC: 27 MG/DL (ref 5–40)
WBC NRBC COR # BLD AUTO: 6.29 10*3/MM3 (ref 3.4–10.8)